# Patient Record
Sex: FEMALE | Race: BLACK OR AFRICAN AMERICAN | NOT HISPANIC OR LATINO | Employment: UNEMPLOYED | URBAN - METROPOLITAN AREA
[De-identification: names, ages, dates, MRNs, and addresses within clinical notes are randomized per-mention and may not be internally consistent; named-entity substitution may affect disease eponyms.]

---

## 2023-10-27 ENCOUNTER — TRANSFERRED RECORDS (OUTPATIENT)
Dept: HEALTH INFORMATION MANAGEMENT | Facility: CLINIC | Age: 23
End: 2023-10-27

## 2023-12-22 ENCOUNTER — TRANSCRIBE ORDERS (OUTPATIENT)
Dept: MATERNAL FETAL MEDICINE | Facility: CLINIC | Age: 23
End: 2023-12-22

## 2023-12-22 DIAGNOSIS — O26.90 PREGNANCY RELATED CONDITION, ANTEPARTUM: Primary | ICD-10-CM

## 2023-12-22 DIAGNOSIS — O09.93 SUPERVISION OF HIGH RISK PREGNANCY IN THIRD TRIMESTER: Primary | ICD-10-CM

## 2023-12-22 DIAGNOSIS — O09.33 LATE PRENATAL CARE AFFECTING PREGNANCY IN THIRD TRIMESTER: ICD-10-CM

## 2023-12-22 PROCEDURE — 99207 PR NO CHARGE NURSE ONLY: CPT | Performed by: ADVANCED PRACTICE MIDWIFE

## 2023-12-22 NOTE — PROGRESS NOTES
Pt provider at [facility] called yesterday 12/21 to get pt set up with ASAP provider care  Pt has not had any prenatal care this pregnancy, almost 33 weeks  Scheduling was unable to schedule immediate RN intake, scheduled immediately for ultrasound/provider visit    Due to delay in RN intake-writer initiated pregnancy episode and ordered prenatal labs, PAP, ultrasound, 1 hour gtt as well if provider desires at prenatal visit next week.    Will route to provider to see if want to do a fetal survey as well at [facility]  Orders for MFM pended      Routing to provider to advise.  Noemy Stearns RN on 12/22/2023 at 8:17 AM    Consulted with Camelia Brooks NP who is able to meet with patient for ultrasound review today 12/22 and possibly do new OB labs/initial prenatal discussion if patient able to stay after ultrasound. Attempted to call pt to discuss staying for appointment after ultrasound, no answer, LMTCB and message left with ultrasound tech and reception to inform pt of option with Camelia following ultrasound.  Noemy Stearns RN on 12/22/2023 at 8:33 AM

## 2023-12-28 ENCOUNTER — TELEPHONE (OUTPATIENT)
Dept: MIDWIFE SERVICES | Facility: CLINIC | Age: 23
End: 2023-12-28

## 2023-12-28 NOTE — TELEPHONE ENCOUNTER
Patient has orders for fetal survey with [facility] and comprehensive ultrasound with [facility]. Fetal survey at [facility] scheduled for 01/03.     Does patient need both scheduled or just MFM order? Patient coming in to RD today, 12/28, for first prenatal with Teri

## 2023-12-29 NOTE — TELEPHONE ENCOUNTER
Patient called clinic and is wondering why she needs [facility] US and 7th floor US. Explained to patient that she needs an US at [facility] per CNM's notes:    She does not need an US in our clinic, but definitely needs [facility]. We cannot date her pregnancy this late with our regular US. But, it looks like [facility] hasn't scheduled her yet.     Hope she shows up today!   Nelson Ramos CNM     Actually, lets keep the US in our clinic. At least we will have something to date her pregnancy and check placental location.   Thanks   Nelson Ramos CNM       Connected patient to [facility] scheduling.     WILFREDO Shaw

## 2024-01-03 ENCOUNTER — ANCILLARY PROCEDURE (OUTPATIENT)
Dept: ULTRASOUND IMAGING | Facility: CLINIC | Age: 24
End: 2024-01-03
Attending: ADVANCED PRACTICE MIDWIFE
Payer: MEDICAID

## 2024-01-03 ENCOUNTER — PRE VISIT (OUTPATIENT)
Dept: MATERNAL FETAL MEDICINE | Facility: CLINIC | Age: 24
End: 2024-01-03

## 2024-01-03 DIAGNOSIS — O09.93 SUPERVISION OF HIGH RISK PREGNANCY IN THIRD TRIMESTER: ICD-10-CM

## 2024-01-03 LAB
ABO/RH(D): NORMAL
ANTIBODY SCREEN: NEGATIVE
SPECIMEN EXPIRATION DATE: NORMAL

## 2024-01-03 PROCEDURE — 76819 FETAL BIOPHYS PROFIL W/O NST: CPT | Performed by: OBSTETRICS & GYNECOLOGY

## 2024-01-03 PROCEDURE — 76816 OB US FOLLOW-UP PER FETUS: CPT | Performed by: OBSTETRICS & GYNECOLOGY

## 2024-01-04 ENCOUNTER — TELEPHONE (OUTPATIENT)
Dept: OBGYN | Facility: CLINIC | Age: 24
End: 2024-01-04

## 2024-01-04 ENCOUNTER — PRENATAL OFFICE VISIT (OUTPATIENT)
Dept: OBGYN | Facility: CLINIC | Age: 24
End: 2024-01-04
Payer: MEDICAID

## 2024-01-04 ENCOUNTER — OFFICE VISIT (OUTPATIENT)
Dept: MATERNAL FETAL MEDICINE | Facility: CLINIC | Age: 24
End: 2024-01-04
Attending: ADVANCED PRACTICE MIDWIFE
Payer: MEDICAID

## 2024-01-04 ENCOUNTER — HOSPITAL ENCOUNTER (OUTPATIENT)
Dept: ULTRASOUND IMAGING | Facility: CLINIC | Age: 24
Discharge: HOME | End: 2024-01-04
Attending: ADVANCED PRACTICE MIDWIFE
Payer: MEDICAID

## 2024-01-04 VITALS
BODY MASS INDEX: 21.9 KG/M2 | HEIGHT: 62 IN | HEART RATE: 91 BPM | OXYGEN SATURATION: 99 % | WEIGHT: 119 LBS | TEMPERATURE: 97.4 F | DIASTOLIC BLOOD PRESSURE: 65 MMHG | SYSTOLIC BLOOD PRESSURE: 110 MMHG

## 2024-01-04 VITALS — DIASTOLIC BLOOD PRESSURE: 74 MMHG | SYSTOLIC BLOOD PRESSURE: 111 MMHG

## 2024-01-04 DIAGNOSIS — O36.5990 FETAL GROWTH RESTRICTION ANTEPARTUM: Primary | ICD-10-CM

## 2024-01-04 DIAGNOSIS — O99.019 ANEMIA IN PREGNANCY: Primary | ICD-10-CM

## 2024-01-04 DIAGNOSIS — O26.90 PREGNANCY RELATED CONDITION, ANTEPARTUM: ICD-10-CM

## 2024-01-04 DIAGNOSIS — O35.9XX0 SUSPECTED FETAL ANOMALY, ANTEPARTUM, SINGLE OR UNSPECIFIED FETUS: ICD-10-CM

## 2024-01-04 DIAGNOSIS — O09.93 HIGH-RISK PREGNANCY IN THIRD TRIMESTER: ICD-10-CM

## 2024-01-04 DIAGNOSIS — O09.93 SUPERVISION OF HIGH RISK PREGNANCY IN THIRD TRIMESTER: Primary | ICD-10-CM

## 2024-01-04 DIAGNOSIS — O99.013 ANEMIA DURING PREGNANCY IN THIRD TRIMESTER: ICD-10-CM

## 2024-01-04 LAB
ALBUMIN UR-MCNC: ABNORMAL MG/DL
APPEARANCE UR: CLEAR
BACTERIA #/AREA URNS HPF: ABNORMAL /HPF
BILIRUB UR QL STRIP: NEGATIVE
COLOR UR AUTO: YELLOW
ERYTHROCYTE [DISTWIDTH] IN BLOOD BY AUTOMATED COUNT: 13 % (ref 10–15)
FERRITIN SERPL-MCNC: 5 NG/ML (ref 6–175)
GLUCOSE 1H P 50 G GLC PO SERPL-MCNC: 98 MG/DL (ref 70–129)
GLUCOSE UR STRIP-MCNC: NEGATIVE MG/DL
HBV SURFACE AG SERPL QL IA: NONREACTIVE
HCT VFR BLD AUTO: 28.3 % (ref 35–47)
HCV AB SERPL QL IA: NONREACTIVE
HGB BLD-MCNC: 9.1 G/DL (ref 11.7–15.7)
HGB BLD-MCNC: 9.1 G/DL (ref 11.7–15.7)
HGB UR QL STRIP: ABNORMAL
HIV 1+2 AB+HIV1 P24 AG SERPL QL IA: NONREACTIVE
IRON BINDING CAPACITY (ROCHE): 487 UG/DL (ref 240–430)
IRON SATN MFR SERPL: 4 % (ref 15–46)
IRON SERPL-MCNC: 21 UG/DL (ref 37–145)
KETONES UR STRIP-MCNC: NEGATIVE MG/DL
LEUKOCYTE ESTERASE UR QL STRIP: ABNORMAL
MCH RBC QN AUTO: 27.1 PG (ref 26.5–33)
MCHC RBC AUTO-ENTMCNC: 32.2 G/DL (ref 31.5–36.5)
MCV RBC AUTO: 84 FL (ref 78–100)
MUCOUS THREADS #/AREA URNS LPF: PRESENT /LPF
NITRATE UR QL: NEGATIVE
PH UR STRIP: 6.5 [PH] (ref 5–7)
PLATELET # BLD AUTO: 300 10E3/UL (ref 150–450)
RBC # BLD AUTO: 3.36 10E6/UL (ref 3.8–5.2)
RBC #/AREA URNS AUTO: ABNORMAL /HPF
SP GR UR STRIP: >=1.03 (ref 1–1.03)
SQUAMOUS #/AREA URNS AUTO: ABNORMAL /LPF
T PALLIDUM AB SER QL: NONREACTIVE
UROBILINOGEN UR STRIP-ACNC: 0.2 E.U./DL
WBC # BLD AUTO: 8.6 10E3/UL (ref 4–11)
WBC #/AREA URNS AUTO: ABNORMAL /HPF
YEAST #/AREA URNS HPF: ABNORMAL /HPF
YEAST #/AREA URNS HPF: ABNORMAL /HPF

## 2024-01-04 PROCEDURE — 83540 ASSAY OF IRON: CPT | Performed by: OBSTETRICS & GYNECOLOGY

## 2024-01-04 PROCEDURE — 87086 URINE CULTURE/COLONY COUNT: CPT | Performed by: OBSTETRICS & GYNECOLOGY

## 2024-01-04 PROCEDURE — 86762 RUBELLA ANTIBODY: CPT | Performed by: OBSTETRICS & GYNECOLOGY

## 2024-01-04 PROCEDURE — 82950 GLUCOSE TEST: CPT | Performed by: OBSTETRICS & GYNECOLOGY

## 2024-01-04 PROCEDURE — 76811 OB US DETAILED SNGL FETUS: CPT | Mod: 26 | Performed by: OBSTETRICS & GYNECOLOGY

## 2024-01-04 PROCEDURE — 83550 IRON BINDING TEST: CPT | Performed by: OBSTETRICS & GYNECOLOGY

## 2024-01-04 PROCEDURE — 87340 HEPATITIS B SURFACE AG IA: CPT | Performed by: OBSTETRICS & GYNECOLOGY

## 2024-01-04 PROCEDURE — 86900 BLOOD TYPING SEROLOGIC ABO: CPT | Performed by: OBSTETRICS & GYNECOLOGY

## 2024-01-04 PROCEDURE — 87389 HIV-1 AG W/HIV-1&-2 AB AG IA: CPT | Performed by: OBSTETRICS & GYNECOLOGY

## 2024-01-04 PROCEDURE — 59025 FETAL NON-STRESS TEST: CPT | Mod: 26 | Performed by: OBSTETRICS & GYNECOLOGY

## 2024-01-04 PROCEDURE — 86803 HEPATITIS C AB TEST: CPT | Performed by: OBSTETRICS & GYNECOLOGY

## 2024-01-04 PROCEDURE — 86777 TOXOPLASMA ANTIBODY: CPT

## 2024-01-04 PROCEDURE — 85027 COMPLETE CBC AUTOMATED: CPT | Performed by: OBSTETRICS & GYNECOLOGY

## 2024-01-04 PROCEDURE — 36415 COLL VENOUS BLD VENIPUNCTURE: CPT | Performed by: OBSTETRICS & GYNECOLOGY

## 2024-01-04 PROCEDURE — 99204 OFFICE O/P NEW MOD 45 MIN: CPT | Mod: 25 | Performed by: OBSTETRICS & GYNECOLOGY

## 2024-01-04 PROCEDURE — 76820 UMBILICAL ARTERY ECHO: CPT | Mod: 26 | Performed by: OBSTETRICS & GYNECOLOGY

## 2024-01-04 PROCEDURE — 99203 OFFICE O/P NEW LOW 30 MIN: CPT | Performed by: OBSTETRICS & GYNECOLOGY

## 2024-01-04 PROCEDURE — 81001 URINALYSIS AUTO W/SCOPE: CPT | Performed by: OBSTETRICS & GYNECOLOGY

## 2024-01-04 PROCEDURE — 86780 TREPONEMA PALLIDUM: CPT | Performed by: OBSTETRICS & GYNECOLOGY

## 2024-01-04 PROCEDURE — 86644 CMV ANTIBODY: CPT | Performed by: OBSTETRICS & GYNECOLOGY

## 2024-01-04 PROCEDURE — 86645 CMV ANTIBODY IGM: CPT | Performed by: OBSTETRICS & GYNECOLOGY

## 2024-01-04 PROCEDURE — 86850 RBC ANTIBODY SCREEN: CPT | Performed by: OBSTETRICS & GYNECOLOGY

## 2024-01-04 PROCEDURE — 59025 FETAL NON-STRESS TEST: CPT

## 2024-01-04 PROCEDURE — 86901 BLOOD TYPING SEROLOGIC RH(D): CPT | Performed by: OBSTETRICS & GYNECOLOGY

## 2024-01-04 PROCEDURE — 76820 UMBILICAL ARTERY ECHO: CPT

## 2024-01-04 PROCEDURE — 82728 ASSAY OF FERRITIN: CPT | Performed by: OBSTETRICS & GYNECOLOGY

## 2024-01-04 ASSESSMENT — PATIENT HEALTH QUESTIONNAIRE - PHQ9: SUM OF ALL RESPONSES TO PHQ QUESTIONS 1-9: 11

## 2024-01-04 NOTE — NURSING NOTE
Patient reports positive fetal movement, no pain, no contractions, leaking of fluid, or bleeding.   Patient denies headache, visual changes, nausea/vomiting, epigastric pain related to preeclampsia.  Education provided to patient on NST/dopplers/BP.  SBAR given to BEVERLY GEE, see their note in Epic.Patient states she has had prenatal care at [facility] in [facility]. Have spoke to prenatal care coordinator and VERO has been signed by patient and attempting to get prenatal records. Patient aware that she will be seen weekly and will receive a phone call from us to schedule her fetal MRI   Le Yanes RN

## 2024-01-04 NOTE — TELEPHONE ENCOUNTER
Per [facility]--pt very overwhelmed after imaging at [facility]  Reports SW was discussed at visit and would like to very much speak with SW.  [facility] also has records of some prenatal care in [place], will send up to our clinic.  Pended order-verify if correct?  Routing to provider to advise.  Noemy Stearns, RN on 1/4/2024 at 4:39 PM

## 2024-01-04 NOTE — PROGRESS NOTES
"CC: New Ob visit  HPI: Ofe Samuels is a 23 year old  here for new Ob visit.  She is a late LASHON as she moved to [place] from [place] about 8 weeks ago.  She has missed a few visits until today, reports that she has been feeling well and has good movement.      She has not received prenatal care in [place] other than ER visit through [facility] on .  Us done showing normal FHR and placenta not low lying.  No anatomy or sizing performed/    She states that she did receive regular care in [place], although we have no records.  She cannot remember how she was dated, states she had an early ultrasound but does not remember when.  She shows me a txt chain on her phone, beginning  about upcoming prenatal appts through oct when she moved.  There are 3 different clinics and every few days there is a new appt confirmation, so I suspect she was not regularly (if at all) attending these visit.      She has housing instability, reports she is \"working on\" getting a permanent place.  She also has transportation issues.  She is relying on a friend today who has a small child with her.  She does not have insurance, has not tried to obtain [place] care since moving here.    She had an ultrasound yesterday for growth and placental location in our office with plan for full anatomy to be done with [facility] on .  During her ultrasound, the growth was found to be <1%tile for all measurements and bilateral ventriculomegaly with abnormal appearance of cerebral anatomy.  We were able to get her schedule with [facility] today for repeat ultrasound.    We discussed these findings and I attempted to get a more accurate picture about her dating to determine if her current dates are good or not.  I explained right now we can't confirm whether she has severe IUGR or not, but since first prenatal visits were to be done in , she is likely at least close to her current dating.  I explained that I have concerns about the cerebral findings, but we will have " "more information from ultrasound with MFM today and f/u plans will be made based on that.  She was understandably distressed about the news and worried about what this means for the baby.  I offered her to stay here until her us appt if she does not have reliable transportation, but she assures me she will be able to return for that visit.    Past Medical History:   Diagnosis Date    Known health problems: none        Past Surgical History:   Procedure Laterality Date    NO HISTORY OF SURGERY       OB History    Para Term  AB Living   1 0 0 0 0 0   SAB IAB Ectopic Multiple Live Births   0 0 0 0 0      # Outcome Date GA Lbr Jacek/2nd Weight Sex Delivery Anes PTL Lv   1 Current              No current outpatient medications on file.    No Known Allergies    No family history on file.    Past medical, social, surgical and family history were reviewed and updated in EPIC.    ROS: No TIA's or unusual headaches, no dysphagia.  No prolonged cough. No dyspnea or chest pain on exertion.  No abdominal pain, change in bowel habits, black or bloody stools.  No urinary tract symptoms.  No new or unusual musculoskeletal symptoms.  Normal menses, no abnormal vaginal bleeding, discharge or unexpected pelvic pain. No new breast lumps, breast pain or nipple discharge.    PE: /65   Pulse 91   Temp 97.4  F (36.3  C)   Ht 1.575 m (5' 2\")   Wt 54 kg (119 lb)   LMP 2023   SpO2 99%   BMI 21.77 kg/m      Gen: Healthy appearing female in no acute distress  Heart: RRR  Lungs: CTAB  Abd: +BS, SNT  Ex: No C/C/E, no suspicious rashes or lesions    A/P:  1) IUP at 34w4d, limited prenatal care.  Fetal IUGR and ventriculomegaly        PNL today with abraham        Unable to find any records in care everywhere under the clinics she was scheduled to be seen in [place].  VERO signed for all 3.  We had an extensive discussion about the ultrasound findings from yesterday and need for more information before understanding " the implications for the remainder of her pregnancy and for the baby after delivery.  She has [facility] us scheduled today at 1:30 and assures me she will be able to get a ride back, declines offer to stay here and work with care coordinator (if available) to begin process of getting insurance.          Reviewed anticipated course of remaining prenatal care, including likely need for regular ultrasound and early delivery based on ongoing findings and decisions regarding IUGR status        Discussed MD call schedule as well as role of residents and med students both in clinic and hospital.  She is ok with resident care       RTC weekly    Belinda Gonzalez MD

## 2024-01-04 NOTE — TELEPHONE ENCOUNTER
Thanks.  I already placed a care coordination referral, so I canceled this one.  Do you know if that is what they were referring to or would she like to talk with Michaelle as well?  If so, we can just forward her chart to michaelle and she will reach out to the patient to talk.  That would be more therapy and not helping her get services (care coord).  Thanks    ASHISH FARR MD

## 2024-01-05 ENCOUNTER — PATIENT OUTREACH (OUTPATIENT)
Dept: CARE COORDINATION | Facility: CLINIC | Age: 24
End: 2024-01-05
Payer: MEDICAID

## 2024-01-05 DIAGNOSIS — D50.9 IRON DEFICIENCY ANEMIA OF MOTHER DURING PREGNANCY: Primary | ICD-10-CM

## 2024-01-05 DIAGNOSIS — O99.019 IRON DEFICIENCY ANEMIA OF MOTHER DURING PREGNANCY: Primary | ICD-10-CM

## 2024-01-05 DIAGNOSIS — O35.9XX0 SUSPECTED FETAL ANOMALY, ANTEPARTUM, SINGLE OR UNSPECIFIED FETUS: ICD-10-CM

## 2024-01-05 PROBLEM — O36.5990 PREGNANCY AFFECTED BY FETAL GROWTH RESTRICTION: Status: ACTIVE | Noted: 2024-01-05

## 2024-01-05 PROBLEM — O35.09X0 PREGNANCY COMPLICATED BY FETAL CEREBRAL VENTRICULOMEGALY: Status: ACTIVE | Noted: 2024-01-05

## 2024-01-05 LAB
BACTERIA UR CULT: NORMAL
CMV IGG SERPL IA-ACNC: >10 U/ML
CMV IGG SERPL IA-ACNC: ABNORMAL
CMV IGM SERPL IA-ACNC: 17.8 AU/ML
CMV IGM SERPL IA-ACNC: NEGATIVE
RUBV IGG SERPL QL IA: 11.2 INDEX
RUBV IGG SERPL QL IA: POSITIVE

## 2024-01-05 RX ORDER — DIPHENHYDRAMINE HYDROCHLORIDE 50 MG/ML
50 INJECTION INTRAMUSCULAR; INTRAVENOUS
Status: CANCELLED
Start: 2024-01-05

## 2024-01-05 RX ORDER — METHYLPREDNISOLONE SODIUM SUCCINATE 125 MG/2ML
125 INJECTION, POWDER, LYOPHILIZED, FOR SOLUTION INTRAMUSCULAR; INTRAVENOUS
Status: CANCELLED
Start: 2024-01-05

## 2024-01-05 RX ORDER — HEPARIN SODIUM,PORCINE 10 UNIT/ML
5-20 VIAL (ML) INTRAVENOUS DAILY PRN
Status: CANCELLED | OUTPATIENT
Start: 2024-01-05

## 2024-01-05 RX ORDER — ALBUTEROL SULFATE 90 UG/1
1-2 AEROSOL, METERED RESPIRATORY (INHALATION)
Status: CANCELLED
Start: 2024-01-05

## 2024-01-05 RX ORDER — EPINEPHRINE 1 MG/ML
0.3 INJECTION, SOLUTION, CONCENTRATE INTRAVENOUS EVERY 5 MIN PRN
Status: CANCELLED | OUTPATIENT
Start: 2024-01-05

## 2024-01-05 RX ORDER — ALBUTEROL SULFATE 0.83 MG/ML
2.5 SOLUTION RESPIRATORY (INHALATION)
Status: CANCELLED | OUTPATIENT
Start: 2024-01-05

## 2024-01-05 RX ORDER — HEPARIN SODIUM (PORCINE) LOCK FLUSH IV SOLN 100 UNIT/ML 100 UNIT/ML
5 SOLUTION INTRAVENOUS
Status: CANCELLED | OUTPATIENT
Start: 2024-01-05

## 2024-01-05 RX ORDER — MEPERIDINE HYDROCHLORIDE 25 MG/ML
25 INJECTION INTRAMUSCULAR; INTRAVENOUS; SUBCUTANEOUS EVERY 30 MIN PRN
Status: CANCELLED | OUTPATIENT
Start: 2024-01-05

## 2024-01-05 NOTE — PROGRESS NOTES
Clinic Care Coordination Contact  [facility]/Voicemail    Chart Review: Specialty reverral from Dr. Gonzalez at [facility] on 1/4/24:  Financial Support - food, transportation, housing, insurance  Additional Information - new pregnant transfer of care from [place] at 34weeks gestation.  unstable housing and no insurance, has not started process of applying.  new pregnancy complications discoverd, will need help getting any services available.  thanks     Clinical Data: Care Coordinator Outreach    Outreach Documentation Number of Outreach Attempt   1/5/2024  10:23 AM 1       Left message on patient's voicemail with call back information and requested return call.    Plan: Care Coordinator will try to reach patient again in 1-2 business days.    Theresa Morrison  Community Health Worker  [facility]  [phone]\

## 2024-01-05 NOTE — PROGRESS NOTES
Clinic Care Coordination Contact  Community Health Worker Initial Outreach      Chart Review: Specialty reverral from Dr. Gonzalez at [facility] on 1/4/24:  Financial Support - food, transportation, housing, insurance  Additional Information - new pregnant transfer of care from [place] at 34weeks gestation.  unstable housing and no insurance, has not started process of applying.  new pregnancy complications discoverd, will need help getting any services available.  thanks       CHW Initial Information Gathering:  Referral Source: Specialist ([facility])  Current living arrangement:: Other (Pt is homeless. Living with a family and their children.)  Type of residence:: Nursing home  Informal Support system:: None  No PCP office visit in Past Year: Yes  Transportation means:: Other (The family she is staying with is providing transportation to medical appointments.)  CHW Additional Questions  If ED/Hospital discharge, follow-up appointment scheduled as recommended?: N/A  Medication changes made following ED/Hospital discharge?: N/A  MyChart active?: Yes  Patient sent Social Determinants of Health questionnaire?: Yes    Patient accepts CC: Yes. Patient scheduled for assessment with JEANNETTE Sanders, on 1/11/24 at 11:00 am. Patient noted desire to discuss WIC, county benefits, insurance, baby supplies, transportation, mental health resources, food resources, housing.       Financial Resource Worker Screening    [facility] Benefits  Is patient requesting help applying for [facility] benefits?: Yes  Have you recently applied for any [facility] benefits?: Yes (12/8/23 around that time)  What was applied for? : SNAP, CASH  Application date:: Beginning of Dec 2023  How many people in your household?: 5  Do you buy/eat food together?: No  What is the monthly gross income for the household (wages, social security, workers comp, and pension)? : 0    Insurance:  [place] insurance?: No  Is this an insurance renewal?: No  Is  "this a new insurance application request?: Yes  Have you recently applied for insurance?: No  How many people in your household? : 5  Do you file taxes?: Yes  How many dependents do you claim?: 1  What is the monthly gross income for the household (wages, social security, workers comp, and pension)? : 0    Any other information for the FRW?: Pt is homeless and recently (last 3 months) came from [place]. Living with a family and their children.  Appears that she applied for [facility] benefits [facility] 12/8/23; had not heard back from the [facility] yet      Pt called me back this morning:  Housing - staying with a family right now. When pt asks if they are friends of her she responds \"sorta\". Recently homeless. Came from [place] within the last 3 months and is \"trying to get on my feet\".  Food - trying to share food with her  Employment - not working  Insurance - does not have any  Baby supplies - does not have any  Transportation - does not drive. People she lives with are helping her get to appointments  Pregnancy - high risk pregnancy. May deliver the baby early, per pt  Mental Health support - pt requests this. \"It's been brooks tough, you know?\"  PCP - pt has none. Explained that CC can assist her til her 6 weeks post partum visit, then would need to establish with a PCP who would continue to assist her with her CC needs. Explained that there are providers within the  [facility] who have availability to see new pts if she would like to establish there.     Theresa Morrison  Community Health Worker  [facility]  [phone]      "

## 2024-01-05 NOTE — PROGRESS NOTES
Clinic Care Coordination Contact  Program: Health insurance,   County:  [facility] Case #:  County Worker:   Stephen #:   Subscriber #:   Renewal:  Date Applied:     FRW Outreach:   1/5/24 -Patient not available during time of call. FRW will call again within 30 days.   Lisseth Call  Financial Resource Worker  [facility]  Clinic Care Coordination  [phone]    Health Insurance:      Referral/Screening:  FRW Screening      Row Name 01/05/24 1111       [facility] Benefits   Is patient requesting help applying for [facility] benefits? Yes       Have you recently applied for any [facility] benefits? Yes  12/8/23 around that time       What was applied for? SNAP;CASH       Application date: Beginning of Dec 2023       How many people in your household? 5       Do you buy/eat food together? No       What is the monthly gross income for the household (wages, social security, workers comp, and pension)? 0       Insurance:   [place]? No       Is this an insurance renewal? No       Is this a new insurance application request? Yes       Have you recently applied for insurance? No       How many people in your household? 5       Do you file taxes? Yes       How many dependents do you claim? 1       What is the monthly gross income for the household (wages, social security, workers comp, and pension)? 0       OTHER   Is this a quita care application? No       Any other information for the FRW? Pt is homeless and recently (last 3 months) came from [place]. Living with a family and their children.  Appears that she applied for [facility] benefits [employer] 12/8/23; had not heard back from the [facility] yet

## 2024-01-06 LAB
T GONDII IGG SER-ACNC: 113 IU/ML
T GONDII IGM SER-ACNC: 6.8 AU/ML

## 2024-01-08 ENCOUNTER — PATIENT OUTREACH (OUTPATIENT)
Dept: CARE COORDINATION | Facility: CLINIC | Age: 24
End: 2024-01-08
Payer: MEDICAID

## 2024-01-08 NOTE — PROGRESS NOTES
Clinic Care Coordination Contact  Program: Health insurance, SNAP,  County: [facility] Case #:  [facility] Worker:   Stephen #:   Subscriber #:   Renewal:  Date Applied:     FRW Outreach:   1/8/24 - Completed application for health insurance already. Approved for [place]. Patient applied for SNAP/CASH already, waiting for [facility] to process application. Case was previously in [place] but transferred to [place]. Patient will call [facility] to check application and see if they would be willing to expedite.   Lisseth Call  Financial Resource Worker  [facility] Care Coordination  [phone]    1/5/24 -Patient not available during time of call. FRW will call again within 30 days.   Lisseth Call  Financial Resource Worker  [facility] Care Coordination  [phone]    Health Insurance:  This subscriber has eligibility for [place]: Medical Assistance.  Elig Type PX: Pregnant woman  Eligibility Begin Date: 10/01/2023  Eligibility End Date: --/--/----  This subscriber is eligible for the following service types: Medical Care ,  Chiropractic ,  Dental Care ,  Hospital ,  Hospital - Inpatient ,  Hospital - Outpatient ,  Emergency Services ,  Pharmacy ,  Professional (Physician) Visit - Office ,  Vision (Optometry) ,  Mental Health ,  Urgent Care    Referral/Screening:  [facility] Screening      Row Name 01/05/24 1111       [facility] Benefits   Is patient requesting help applying for [facility] benefits? Yes       Have you recently applied for any [facility] benefits? Yes  12/8/23 around that time       What was applied for? SNAP;CASH       Application date: Beginning of Dec 2023       How many people in your household? 5       Do you buy/eat food together? No       What is the monthly gross income for the household (wages, social security, workers comp, and pension)? 0       Insurance:   [place]? No       Is this an insurance renewal? No       Is this a new insurance application  request? Yes       Have you recently applied for insurance? No       How many people in your household? 5       Do you file taxes? Yes       How many dependents do you claim? 1       What is the monthly gross income for the household (wages, social security, workers comp, and pension)? 0       OTHER   Is this a quita care application? No       Any other information for the FRW? Pt is homeless and recently (last 3 months) came from [place]. Living with a family and their children.  Appears that she applied for [facility] benefits teresa 12/8/23; had not heard back from the [facility] yet

## 2024-01-09 ENCOUNTER — DOCUMENTATION ONLY (OUTPATIENT)
Dept: MATERNAL FETAL MEDICINE | Facility: CLINIC | Age: 24
End: 2024-01-09
Payer: MEDICAID

## 2024-01-10 ENCOUNTER — TELEPHONE (OUTPATIENT)
Dept: MATERNAL FETAL MEDICINE | Facility: CLINIC | Age: 24
End: 2024-01-10
Payer: MEDICAID

## 2024-01-10 NOTE — TELEPHONE ENCOUNTER
Pt called to offer Fetal MRI appointment on 1/16/24 at 10:30 am rather than 1/22/24. Pt agreeable to change. Verbally given MRI instructions, and pt emailed personalized MRI Fetal Patient Handout with date/time, instructions, directions to [facility], and parking information. Pt given PCC contact information for further questions. Pt was also wondering about dating change d/t FGR diagnosis at last US. Pt informed that dating at this time remains the same as her previous ultrasounds matched LMP dating. Pt verbalized understanding. Denies further questions or concerns at this time.    Gabriella Sepulveda RN

## 2024-01-11 ENCOUNTER — PATIENT OUTREACH (OUTPATIENT)
Dept: CARE COORDINATION | Facility: CLINIC | Age: 24
End: 2024-01-11

## 2024-01-11 NOTE — PROGRESS NOTES
Clinic Care Coordination Contact  [facility]/Voicemail    Clinical Data: Care Coordinator Outreach    Outreach Documentation Number of Outreach Attempt   1/5/2024  10:23 AM 1   1/11/2024  11:32 AM 1       Left message on patient's voicemail with call back information and requested return call.    Plan: Care Coordinator will send unable to contact letter with care coordinator contact information via [employer]. Care Coordinator will try to reach patient again in 1-2 business days.    Marilyn Salmon [facility]  Phone: [phone]  E-mail: [email or web address]

## 2024-01-12 ENCOUNTER — OFFICE VISIT (OUTPATIENT)
Dept: MATERNAL FETAL MEDICINE | Facility: CLINIC | Age: 24
End: 2024-01-12
Attending: OBSTETRICS & GYNECOLOGY
Payer: MEDICAID

## 2024-01-12 ENCOUNTER — PRENATAL OFFICE VISIT (OUTPATIENT)
Dept: OBGYN | Facility: CLINIC | Age: 24
End: 2024-01-12
Payer: MEDICAID

## 2024-01-12 ENCOUNTER — HOSPITAL ENCOUNTER (OUTPATIENT)
Dept: ULTRASOUND IMAGING | Facility: CLINIC | Age: 24
Discharge: HOME | End: 2024-01-12
Attending: OBSTETRICS & GYNECOLOGY
Payer: MEDICAID

## 2024-01-12 VITALS — SYSTOLIC BLOOD PRESSURE: 117 MMHG | HEART RATE: 97 BPM | OXYGEN SATURATION: 100 % | DIASTOLIC BLOOD PRESSURE: 73 MMHG

## 2024-01-12 VITALS
HEART RATE: 90 BPM | SYSTOLIC BLOOD PRESSURE: 112 MMHG | DIASTOLIC BLOOD PRESSURE: 70 MMHG | OXYGEN SATURATION: 100 % | TEMPERATURE: 98.9 F | BODY MASS INDEX: 21.75 KG/M2 | WEIGHT: 118.9 LBS

## 2024-01-12 DIAGNOSIS — O35.06X0 FETAL HYDROCEPHALUS AFFECTING MANAGEMENT OF MOTHER IN SINGLETON PREGNANCY: ICD-10-CM

## 2024-01-12 DIAGNOSIS — O35.09X0 PREGNANCY COMPLICATED BY FETAL CEREBRAL VENTRICULOMEGALY, SINGLE OR UNSPECIFIED FETUS: Primary | ICD-10-CM

## 2024-01-12 DIAGNOSIS — O36.5990 FETAL GROWTH RESTRICTION ANTEPARTUM: ICD-10-CM

## 2024-01-12 DIAGNOSIS — O09.93 SUPERVISION OF HIGH RISK PREGNANCY IN THIRD TRIMESTER: Primary | ICD-10-CM

## 2024-01-12 DIAGNOSIS — O36.5990 FETAL GROWTH RESTRICTION ANTEPARTUM: Primary | ICD-10-CM

## 2024-01-12 DIAGNOSIS — O35.9XX0 SUSPECTED FETAL ANOMALY, ANTEPARTUM, SINGLE OR UNSPECIFIED FETUS: ICD-10-CM

## 2024-01-12 PROCEDURE — 87653 STREP B DNA AMP PROBE: CPT | Performed by: OBSTETRICS & GYNECOLOGY

## 2024-01-12 PROCEDURE — 86778 TOXOPLASMA ANTIBODY IGM: CPT | Mod: 90 | Performed by: OBSTETRICS & GYNECOLOGY

## 2024-01-12 PROCEDURE — 36415 COLL VENOUS BLD VENIPUNCTURE: CPT | Performed by: OBSTETRICS & GYNECOLOGY

## 2024-01-12 PROCEDURE — 99212 OFFICE O/P EST SF 10 MIN: CPT | Performed by: OBSTETRICS & GYNECOLOGY

## 2024-01-12 PROCEDURE — 76815 OB US LIMITED FETUS(S): CPT | Mod: 26 | Performed by: OBSTETRICS & GYNECOLOGY

## 2024-01-12 PROCEDURE — 96040 HC GENETIC COUNSELING, EACH 30 MINUTES: CPT

## 2024-01-12 PROCEDURE — 59025 FETAL NON-STRESS TEST: CPT | Mod: 26 | Performed by: OBSTETRICS & GYNECOLOGY

## 2024-01-12 PROCEDURE — 59025 FETAL NON-STRESS TEST: CPT

## 2024-01-12 PROCEDURE — 76820 UMBILICAL ARTERY ECHO: CPT | Mod: 26 | Performed by: OBSTETRICS & GYNECOLOGY

## 2024-01-12 PROCEDURE — 86777 TOXOPLASMA ANTIBODY: CPT | Mod: 90 | Performed by: OBSTETRICS & GYNECOLOGY

## 2024-01-12 PROCEDURE — 86406 PARTICLE AGGLUT ANTBDY TITR: CPT | Mod: 90 | Performed by: OBSTETRICS & GYNECOLOGY

## 2024-01-12 PROCEDURE — 99000 SPECIMEN HANDLING OFFICE-LAB: CPT | Performed by: OBSTETRICS & GYNECOLOGY

## 2024-01-12 PROCEDURE — 76820 UMBILICAL ARTERY ECHO: CPT

## 2024-01-12 PROCEDURE — 86644 CMV ANTIBODY: CPT | Mod: 90 | Performed by: OBSTETRICS & GYNECOLOGY

## 2024-01-12 RX ORDER — FERROUS SULFATE 325(65) MG
1 TABLET ORAL
Status: ON HOLD | COMMUNITY
Start: 2023-06-26 | End: 2024-01-24

## 2024-01-12 RX ORDER — PNV NO.95/FERROUS FUM/FOLIC AC 28MG-0.8MG
1 TABLET ORAL
Status: ON HOLD | COMMUNITY
Start: 2023-06-26 | End: 2024-01-24

## 2024-01-12 RX ORDER — DIPHENHYDRAMINE HYDROCHLORIDE 25 MG/1
1 CAPSULE ORAL
Status: ON HOLD | COMMUNITY
Start: 2023-07-07 | End: 2024-01-23

## 2024-01-12 NOTE — NURSING NOTE
Patient reports positive fetal movement, denies contractions, leaking of fluid, or bleeding.  SBAR given to BEVERLY GEE, see their note in Epic.  NST Performed due to severe FGR.  Dr. Brenner reviewed efm tracing. See NST/BPP Doc Flowsheet tab.

## 2024-01-12 NOTE — PROGRESS NOTES
"[facility]  Genetic Counseling Consult    Patient: Ofe Samuels YOB: 2000   Date of Service: 24      Ofe Samuels was seen at [facility] for genetic consultation to discuss the options for  genetic testing given prenatal ultrasound finding of fetal growth restriction and ventriculomegaly.        Impression/Plan:   1.  Today we reviewed option of  testing and inpatient genetics consultation. Consent was obtained today and has been scanned into her chart. Plan for SNP array with limited G-bands on cord blood at time of delivery and inpatient genetics consult. Orders will be placed and held in fetal chart. The patient will be contacted to discuss results as they become available.    2. Ofe also had a follow-up ultrasound today. Please see ultrasound report for details.        FAMILY HISTORY   A three-generation pedigree was obtained today and is scanned under the \"Media\" tab in Epic. The family history was reported by Ofe.    The following significant findings were reported today:   Ofe reports that she has a paternal half brother who had speech delays.   Otherwise, Ofe reports no other significant findings for any of her family members, although she notes that most of her family members do not live in the [place] and she has limited information about their health/development.   The father of the pregnancy, Drew, is 29 and healthy. He has a 6 year old daughter with a previous partner who is healthy and typically developing. Otherwise, no significant family history findings were reported for Drew's family members.     Otherwise, the reported family history is unremarkable for multiple miscarriages, stillbirths, birth defects, intellectual disabilities, known genetic conditions, and consanguinity.    Discussion:   Ofe's level II ultrasound at 34w4d identified ventriculomegaly and fetal growth " restriction. Ventriculomegaly is an enlargement of the fluid-filled spaces (ventricles) in each side of the brain. The typical width of each ventricle is less than 10 mm. Mild ventriculomegaly is typically 10-12 mm, moderate ventriculomegaly 12-15 mm and severe ventriculomegaly above 15 mm. Severe ventriculomegaly is sometimes referred to as hydrocephalus. Moderate or severe ventriculomegaly is concerning, especially if there are other abnormalities detected. The ventriculomegaly identified in Ofe's pregnancy was classified as severe. Severe ventriculomegaly is frequently associated with neurodevelopmental delays. However, we reviewed that it is impossible to predict the exact neurodevelopmental prognosis for a fetus prenatally and this could be a wide range.     We discussed that ventriculomegaly can be isolated or can have an underlying cause including fetal infections or genetic conditions. Severe bilateral ventriculomegaly in the setting of fetal growth restriction increases concern for an underlying etioogy.     Approximately 5% of fetuses with isolated mild to moderate ventriculomegaly have an abnormal karyotype, with most common finding of trisomy 21. Of note, Ofe has had a low-risk NIPT test which significantly reduces the risk of trisomy 21, trisomy 18, or trisomy 13 in her pregnancy. We reviewed that there are many other genetic conditions that can be associated with ventriculomegaly, including copy number variants and single-gene conditions.     Ventriculomegaly and growth restriction can also be caused by infections, including toxoplasma gondii and cytomegalovirus. Ofe has had maternal serum CMV and toxoplasmosis IgG and IgM levels drawn at 34w4d, both of which identified a positive IgG and negative IgM. These results are consistent with a past infection (likely at least 6 months ago) but NOT a current infection. However, given Ofe's gestational age at time of draw, a first trimester infection cannot  be ruled out. Ofe denies any illnesses during pregnancy, exposure to cats, or eating raw meat. She has spent a lot of time around children in the current pregnancy. CMV IgG avidity and toxoplasmosis labs through [facility] were drawn today; please see Dr. Brenner's ultrasound report for details.     After a brief review of Ofe 's prenatal history and available testing options, Ofe was interested in pursuing genetic testing on cord blood at the time of delivery to clarify the possibility of an underlying genetic etiology for the identified ventriculomegaly and fetal growth restriction.  We reviewed the option of SNP array with limited G-bands on cord blood testing ([id number]), which can help clarify prognostic information and possible recurrence risk. We discussed that this testing cannot completely rule out the possibility of all genetic conditions such as single gene disorders and that it is possible additional testing may be recommended by pediatric genetics after delivery.      Microarray testing involves looking for small extra (duplications) or missing (deletions) pieces of DNA within the chromosomes. Chromosomal deletions and duplications may cause problems with an individual's health and development including learning disabilities, developmental delays, growth issues, physical differences, and psychiatric challenges. The specific symptoms would depend on the size and gene content of the specific chromosomal region involved.  Microarray testing can also identify whether there are areas within a pair of chromosomes that are too similar to each other, which could indicate that the individual's parents may be related to each other such as cousins, or could represent a phenomenon known as uniparental disomy (UPD) which is where an individual inherits more of a specific chromosome region from one parent than the other parent.  Depending on the chromosome(s) involved, this  genetic similarity  can sometimes lead to  growth, developmental and/or physical problems for the individual.     We reviewed the benefits, limitations, and possible results from a microarray analysis which can include:    Negative: No clinically significant deletions or duplications were identified. This may not rule out a genetic cause for the fetal features.    Positive: A deletion, duplication, or genetic similarity was identified that may be associated with a particular set of symptoms or known syndrome.     Variant of uncertain significance (VUS): A deletion or duplication was identified, but it is not known if it explains the clinical features or it is is normal variation.    Chromosome analysis (karyotype) assesses for the number and structure of chromosomes. It would be able to assess if chromosomal translocations are present or erroneously rearranged. It would be able to determine if there are small pieces of chromosomal material that are duplicated or deleted, however not to the level of detail that a microarray would be able to provide. This test would provide information regarding recurrence risks for aneuploidies (e.g. trisomy 21 due to sporadic non-disjunction vs due to translocation).     Specimen requirements for [id number] are 5 mL of cord blood in a green top tube and 5mL of cord blood in a purple top tube. The consent for genetic testing was signed at today's visit and is scanned into her chart.     I also offered Ofe a prenatal genetics consult with Dr. Magana following her MRI if we are able to coordinate this prior to delivery, which she is interested in. I will ask PCCs to help coordinate this appointment.     Ofe is understandably struggling to process all of the information we discussed today and is feeling worried about what care her baby will need immediately after delivery as well as what these findings might mean long-term for her baby's health and neurodevelopment. I normalized her feeling of anxiety about the uncertainty  surrounding her baby's prognosis. I offered a referral to [facility] for additional psychosocial support, which Ofe accepted today.     It was a pleasure to be involved with Ofe 's care. Face-to-face time of the meeting was 45 minutes.    Belinda Shetty [facility], [facility]  Licensed Genetic Counselor  [facility]  Maternal Fetal Medicine  Phone: [phone]  [email or web address]

## 2024-01-12 NOTE — PROGRESS NOTES
"Please see \"Imaging\" tab under \"Chart Review\" for details of today's US at the [facility].    Faraz Brenner MD  Maternal-Fetal Medicine    "

## 2024-01-12 NOTE — PROGRESS NOTES
35w5d feeling ok physically but worried and feels like she just doesn't understand what she should even worry about.  Doesn't understand why delivery is recommended at 37w and what will happen to the baby after birth.  I explained the two issues are FGR and the ventriculomegaly.  I explained that during pregnancy FGR is the main concern as the risk is stillbirth.  The reason for delivery at 37w is that is the tipping point when the risks to the baby (IUGR) outweighs the risk of early delivery.  We discussed that the concerns about the brain will be more prominent after delivery.  She verbalizes understanding and is agreeable to scheduling IOL on .    We did discuss that we are still waiting for some lab results and also the MRI next week for more assessment of possible cause and imaging of brain, but ultimately, we won't know overall condition of baby until after birth and maybe not even for a while beyond that.  We discussed the induction process and that it may take 24-48h. I explained that sometimes baby's with FGR do not tolerate labor well so she is at increased risk for .  We discussed what she should bring to hospital and what is available for her and baby.  We discussed NICU at delivery and continued monitoring by peds following birth, but until then we won't really know what that looks like.  She verbalizes understanding.      She has her iv iron infusions scheduled, but first not until .  I asked her to keep that appt unless conflict arises and we can give additional dose after delivery if needed.  GBS today.  RTC weekly  vi

## 2024-01-13 LAB — GP B STREP DNA SPEC QL NAA+PROBE: NEGATIVE

## 2024-01-16 ENCOUNTER — HOSPITAL ENCOUNTER (OUTPATIENT)
Dept: MRI IMAGING | Facility: CLINIC | Age: 24
Discharge: HOME | End: 2024-01-16
Attending: OBSTETRICS & GYNECOLOGY | Admitting: OBSTETRICS & GYNECOLOGY
Payer: MEDICAID

## 2024-01-16 ENCOUNTER — TELEPHONE (OUTPATIENT)
Dept: MATERNAL FETAL MEDICINE | Facility: CLINIC | Age: 24
End: 2024-01-16
Payer: MEDICAID

## 2024-01-16 DIAGNOSIS — O35.9XX0 SUSPECTED FETAL ANOMALY, ANTEPARTUM, SINGLE OR UNSPECIFIED FETUS: ICD-10-CM

## 2024-01-16 LAB — CMV IGG AVIDITY SERPL IA-RTO: 0.95 %

## 2024-01-16 PROCEDURE — 74712 MRI FETAL SNGL/1ST GESTATION: CPT

## 2024-01-16 PROCEDURE — 74712 MRI FETAL SNGL/1ST GESTATION: CPT | Mod: 26 | Performed by: RADIOLOGY

## 2024-01-16 NOTE — TELEPHONE ENCOUNTER
January 16, 2024    Received voicemail from Ofe stating that she changed her mind and wishes to cancel the genetic testing ordered on cord blood in the fetal chart and requesting a call back. I called Ofe back and she confirmed that she has thought more about genetic testing and has decided that she wishes to wait until her baby is born before deciding whether she wishes to proceed with genetic testing. She also asked that I cancel the order for an inpatient genetics consultation. She is aware that genetics involvement will likely be brought up again by her baby's NICU providers and she is open to this discussion, but wants to wait until her baby is born before making any decisions about genetics care.     I encouraged Ofe to reach out to me if she has any further questions prior to her delivery.     Plan:  Ofe DECLINED genetic testing on cord blood. The order previously placed in the fetal chart will be cancelled.   Ofe DECLINED inpatient genetics consultation at this time. She is open to discussing the option of a genetics consultation again after delivery but does not want this order placed in the fetal chart at this time. The order previously placed in the fetal chart will be cancelled.     Belinda Shetty, [facility], [facility]  Licensed Genetic Counselor  [facility]  Maternal Fetal Medicine  Phone: [phone]  [email or web address]    NPO  NGT to wall suction  KUB daily  CXR today nothing acute  IVF started NS at 150ml/hr    He meets SIRS criteria but I think better explained by SBO presentation that infection.  UA > 100 WBC but he has no urinary tract symptoms. He has recurrent UTI and will start Rocephin now but my clinical impression is the abnormal vitals and elevated lactic acid are due to SBO, dehydration and not UTI sepsis

## 2024-01-18 ENCOUNTER — TELEPHONE (OUTPATIENT)
Dept: OBGYN | Facility: CLINIC | Age: 24
End: 2024-01-18

## 2024-01-18 ENCOUNTER — HOSPITAL ENCOUNTER (OUTPATIENT)
Dept: ULTRASOUND IMAGING | Facility: CLINIC | Age: 24
Discharge: HOME | End: 2024-01-18
Attending: OBSTETRICS & GYNECOLOGY
Payer: MEDICAID

## 2024-01-18 ENCOUNTER — OFFICE VISIT (OUTPATIENT)
Dept: MATERNAL FETAL MEDICINE | Facility: CLINIC | Age: 24
End: 2024-01-18
Attending: OBSTETRICS & GYNECOLOGY
Payer: MEDICAID

## 2024-01-18 ENCOUNTER — PRENATAL OFFICE VISIT (OUTPATIENT)
Dept: OBGYN | Facility: CLINIC | Age: 24
End: 2024-01-18
Payer: MEDICAID

## 2024-01-18 VITALS
DIASTOLIC BLOOD PRESSURE: 72 MMHG | TEMPERATURE: 100.5 F | OXYGEN SATURATION: 100 % | WEIGHT: 121.8 LBS | BODY MASS INDEX: 22.28 KG/M2 | SYSTOLIC BLOOD PRESSURE: 106 MMHG | HEART RATE: 96 BPM

## 2024-01-18 DIAGNOSIS — O36.5990 FETAL GROWTH RESTRICTION ANTEPARTUM: ICD-10-CM

## 2024-01-18 DIAGNOSIS — O09.93 SUPERVISION OF HIGH RISK PREGNANCY IN THIRD TRIMESTER: Primary | ICD-10-CM

## 2024-01-18 DIAGNOSIS — O36.5990 FETAL GROWTH RESTRICTION ANTEPARTUM: Primary | ICD-10-CM

## 2024-01-18 DIAGNOSIS — N89.8 VAGINAL DISCHARGE: ICD-10-CM

## 2024-01-18 LAB
CLUE CELLS: ABNORMAL
TRICHOMONAS, WET PREP: ABNORMAL
WBC'S/HIGH POWER FIELD, WET PREP: ABNORMAL
YEAST, WET PREP: PRESENT

## 2024-01-18 PROCEDURE — 76820 UMBILICAL ARTERY ECHO: CPT

## 2024-01-18 PROCEDURE — 99207 PR PRENATAL VISIT: CPT | Performed by: OBSTETRICS & GYNECOLOGY

## 2024-01-18 PROCEDURE — 76815 OB US LIMITED FETUS(S): CPT | Mod: 26 | Performed by: OBSTETRICS & GYNECOLOGY

## 2024-01-18 PROCEDURE — 87210 SMEAR WET MOUNT SALINE/INK: CPT | Performed by: OBSTETRICS & GYNECOLOGY

## 2024-01-18 PROCEDURE — 76820 UMBILICAL ARTERY ECHO: CPT | Mod: 26 | Performed by: OBSTETRICS & GYNECOLOGY

## 2024-01-18 PROCEDURE — 99213 OFFICE O/P EST LOW 20 MIN: CPT | Mod: 25 | Performed by: OBSTETRICS & GYNECOLOGY

## 2024-01-18 PROCEDURE — 59025 FETAL NON-STRESS TEST: CPT | Mod: 26 | Performed by: OBSTETRICS & GYNECOLOGY

## 2024-01-18 RX ORDER — FLUCONAZOLE 150 MG/1
150 TABLET ORAL ONCE
Qty: 1 TABLET | Refills: 0 | Status: SHIPPED | OUTPATIENT
Start: 2024-01-18 | End: 2024-01-18

## 2024-01-18 NOTE — PROGRESS NOTES
"Please see \"Imaging\" tab under \"Chart Review\" for details of today's US at the [facility].    Faraz Brenner MD  Maternal-Fetal Medicine    "

## 2024-01-18 NOTE — PROGRESS NOTES
Return OB visit    Subjective:  Patient reports active fetal movement, no vaginal bleeding or leaking fluid. She denies contractions. She expresses continued uncertainty regarding what to expect after delivery due to the fetal ventriculometry but overall coping well. Declines to meet with [facility] today but will follow up after delivery if needed. Patient does have increased vaginal discharge and itching.      Objective:  /72 (BP Location: Right arm, Patient Position: Sitting, Cuff Size: Adult Regular)   Pulse 96   Temp (!) 100.5  F (38.1  C)   Wt 55.2 kg (121 lb 12.8 oz)   LMP 2023   SpO2 100%   BMI 22.28 kg/m     See OB flow sheet    Component      Latest Ref Rng 2024  1:59 PM   Trichomonas      Absent  Absent    Yeast      Absent  Present !    Clue cells      Absent  Absent    WBCs/high power field      None  2+ !       Legend:  ! Abnormal  Assessment and Plan    Ofe Samuels is a 23 year old  at 36w4d here for KILO visit, pregnancy complicated by FGR and ventriculomegaly.     This visit:  (O09.93) Supervision of high risk pregnancy in third trimester  (primary encounter diagnosis)  -Reviewed plan for IOL on  and discussed expectations around IOL and pain control in labor. Reviewed bleeding precautions and FKC.   -GBS done at last visit and negative, Hgb 9.1 and first IV iron infusion scheduled for tomorrow   RTC for post partum visit   -Initial temp elevated but not repeated prior to leaving the office. Patient called at home and asked her to repeat her temp and call if >100.4. She denies feeling unwell. No subjective fever/chills.     (N89.8) Vaginal discharge  -Wet prep positive for vaginal candidasis, Rx sent to pharmacy for diflucan   Plan: Wet preparation, fluconazole (DIFLUCAN) 150 MG         tablet          Dispo: RTC for PP visit     Antonina Gongora MD

## 2024-01-18 NOTE — NURSING NOTE
Patient reports + fetal movement, no pain, no contractions, leaking of fluid, or bleeding. Reports some discomfort r/t pruritus on perineum.  Has OBV today following her MFM appt and will discuss w/primary provder. NST completed, see flowsheet,  given SBAR and reviewed fetal monitoring strip, see his note in Epic.

## 2024-01-18 NOTE — TELEPHONE ENCOUNTER
Spoke with patient, per Dr. Gongora, informed her that her lab results were positive for a yeast infection and that a prescription for Diflucan was sent to her pharmacy.    When patient presented to clinic, temperature was 100.5F and left before a repeat temperature could be taken. During phone call I instructed patient to repeat temperature at home, patient states she did not have a thermometer but would get one. Per Dr. Gongora, patient is to call back if her temperature is higher than 100.4f.  Patient verbalized understanding.    PATRICK LEES, LEONILAN   Sarecycline Counseling: Patient advised regarding possible photosensitivity and discoloration of the teeth, skin, lips, tongue and gums.  Patient instructed to avoid sunlight, if possible.  When exposed to sunlight, patients should wear protective clothing, sunglasses, and sunscreen.  The patient was instructed to call the office immediately if the following severe adverse effects occur:  hearing changes, easy bruising/bleeding, severe headache, or vision changes.  The patient verbalized understanding of the proper use and possible adverse effects of sarecycline.  All of the patient's questions and concerns were addressed.

## 2024-01-19 ENCOUNTER — INFUSION THERAPY VISIT (OUTPATIENT)
Dept: INFUSION THERAPY | Facility: CLINIC | Age: 24
End: 2024-01-19
Attending: OBSTETRICS & GYNECOLOGY
Payer: MEDICAID

## 2024-01-19 ENCOUNTER — PATIENT OUTREACH (OUTPATIENT)
Dept: CARE COORDINATION | Facility: CLINIC | Age: 24
End: 2024-01-19
Payer: MEDICAID

## 2024-01-19 VITALS
RESPIRATION RATE: 18 BRPM | HEART RATE: 93 BPM | SYSTOLIC BLOOD PRESSURE: 109 MMHG | OXYGEN SATURATION: 99 % | DIASTOLIC BLOOD PRESSURE: 68 MMHG

## 2024-01-19 DIAGNOSIS — D50.9 IRON DEFICIENCY ANEMIA OF MOTHER DURING PREGNANCY: Primary | ICD-10-CM

## 2024-01-19 DIAGNOSIS — O99.019 IRON DEFICIENCY ANEMIA OF MOTHER DURING PREGNANCY: Primary | ICD-10-CM

## 2024-01-19 PROCEDURE — 96365 THER/PROPH/DIAG IV INF INIT: CPT

## 2024-01-19 PROCEDURE — 250N000011 HC RX IP 250 OP 636: Performed by: OBSTETRICS & GYNECOLOGY

## 2024-01-19 PROCEDURE — 258N000003 HC RX IP 258 OP 636: Performed by: OBSTETRICS & GYNECOLOGY

## 2024-01-19 RX ORDER — ALBUTEROL SULFATE 0.83 MG/ML
2.5 SOLUTION RESPIRATORY (INHALATION)
OUTPATIENT
Start: 2024-01-21

## 2024-01-19 RX ORDER — EPINEPHRINE 1 MG/ML
0.3 INJECTION, SOLUTION, CONCENTRATE INTRAVENOUS EVERY 5 MIN PRN
OUTPATIENT
Start: 2024-01-21

## 2024-01-19 RX ORDER — HEPARIN SODIUM (PORCINE) LOCK FLUSH IV SOLN 100 UNIT/ML 100 UNIT/ML
5 SOLUTION INTRAVENOUS
OUTPATIENT
Start: 2024-01-21

## 2024-01-19 RX ORDER — HEPARIN SODIUM,PORCINE 10 UNIT/ML
5-20 VIAL (ML) INTRAVENOUS DAILY PRN
OUTPATIENT
Start: 2024-01-21

## 2024-01-19 RX ORDER — METHYLPREDNISOLONE SODIUM SUCCINATE 125 MG/2ML
125 INJECTION, POWDER, LYOPHILIZED, FOR SOLUTION INTRAMUSCULAR; INTRAVENOUS
Start: 2024-01-21

## 2024-01-19 RX ORDER — DIPHENHYDRAMINE HYDROCHLORIDE 50 MG/ML
50 INJECTION INTRAMUSCULAR; INTRAVENOUS
Start: 2024-01-21

## 2024-01-19 RX ORDER — MEPERIDINE HYDROCHLORIDE 25 MG/ML
25 INJECTION INTRAMUSCULAR; INTRAVENOUS; SUBCUTANEOUS EVERY 30 MIN PRN
OUTPATIENT
Start: 2024-01-21

## 2024-01-19 RX ORDER — ALBUTEROL SULFATE 90 UG/1
1-2 AEROSOL, METERED RESPIRATORY (INHALATION)
Start: 2024-01-21

## 2024-01-19 RX ADMIN — IRON SUCROSE 300 MG: 20 INJECTION, SOLUTION INTRAVENOUS at 13:45

## 2024-01-19 NOTE — PROGRESS NOTES
Infusion Nursing Note:  Ofe Samuels presents today for venofer infusion.    Patient seen by provider today: No   present during visit today: Not Applicable.    Note: Patient nervous regarding infusion. Reassurance provided. Education provided. Patient does not have questions at this time.      Intravenous Access:  Peripheral IV placed.    Treatment Conditions:  Not Applicable.      Post Infusion Assessment:  Patient tolerated infusion without incident.  Blood return noted pre and post infusion.  Site patent and intact, free from redness, edema or discomfort.  No evidence of extravasations.  Access discontinued per protocol.       Discharge Plan:   Discharge instructions reviewed with: Patient.  Patient and/or family verbalized understanding of discharge instructions and all questions answered.  Patient discharged in stable condition accompanied by: self.  Departure Mode: Ambulatory.      Crystal Bustamante RN

## 2024-01-21 ENCOUNTER — HOSPITAL ENCOUNTER (INPATIENT)
Facility: CLINIC | Age: 24
LOS: 3 days | Discharge: HOME | End: 2024-01-24
Attending: OBSTETRICS & GYNECOLOGY | Admitting: OBSTETRICS & GYNECOLOGY
Payer: MEDICAID

## 2024-01-21 ENCOUNTER — ANESTHESIA (OUTPATIENT)
Dept: OBGYN | Facility: CLINIC | Age: 24
End: 2024-01-21
Payer: MEDICAID

## 2024-01-21 ENCOUNTER — ANESTHESIA EVENT (OUTPATIENT)
Dept: OBGYN | Facility: CLINIC | Age: 24
End: 2024-01-21
Payer: MEDICAID

## 2024-01-21 DIAGNOSIS — O09.93 SUPERVISION OF HIGH RISK PREGNANCY IN THIRD TRIMESTER: Primary | ICD-10-CM

## 2024-01-21 PROBLEM — Z34.90 PREGNANCY: Status: ACTIVE | Noted: 2024-01-21

## 2024-01-21 LAB
ABO/RH(D): NORMAL
ANTIBODY SCREEN: NEGATIVE
BASOPHILS # BLD AUTO: 0 10E3/UL (ref 0–0.2)
BASOPHILS NFR BLD AUTO: 0 %
EOSINOPHIL # BLD AUTO: 0.1 10E3/UL (ref 0–0.7)
EOSINOPHIL NFR BLD AUTO: 1 %
ERYTHROCYTE [DISTWIDTH] IN BLOOD BY AUTOMATED COUNT: 13.6 % (ref 10–15)
HCT VFR BLD AUTO: 28 % (ref 35–47)
HGB BLD-MCNC: 8.6 G/DL (ref 11.7–15.7)
IMM GRANULOCYTES # BLD: 0.1 10E3/UL
IMM GRANULOCYTES NFR BLD: 1 %
LYMPHOCYTES # BLD AUTO: 1.7 10E3/UL (ref 0.8–5.3)
LYMPHOCYTES NFR BLD AUTO: 20 %
MCH RBC QN AUTO: 25.6 PG (ref 26.5–33)
MCHC RBC AUTO-ENTMCNC: 31.4 G/DL (ref 31.5–36.5)
MCV RBC AUTO: 83 FL (ref 78–100)
MONOCYTES # BLD AUTO: 0.8 10E3/UL (ref 0–1.3)
MONOCYTES NFR BLD AUTO: 9 %
NEUTROPHILS # BLD AUTO: 6 10E3/UL (ref 1.6–8.3)
NEUTROPHILS NFR BLD AUTO: 69 %
NRBC # BLD AUTO: 0 10E3/UL
NRBC BLD AUTO-RTO: 0 /100
PLATELET # BLD AUTO: 313 10E3/UL (ref 150–450)
RBC # BLD AUTO: 3.36 10E6/UL (ref 3.8–5.2)
SPECIMEN EXPIRATION DATE: NORMAL
T PALLIDUM AB SER QL: NONREACTIVE
WBC # BLD AUTO: 8.6 10E3/UL (ref 4–11)

## 2024-01-21 PROCEDURE — 86780 TREPONEMA PALLIDUM: CPT

## 2024-01-21 PROCEDURE — 120N000002 HC R&B MED SURG/OB UMMC

## 2024-01-21 PROCEDURE — 258N000003 HC RX IP 258 OP 636

## 2024-01-21 PROCEDURE — 250N000009 HC RX 250

## 2024-01-21 PROCEDURE — 86900 BLOOD TYPING SEROLOGIC ABO: CPT

## 2024-01-21 PROCEDURE — 99221 1ST HOSP IP/OBS SF/LOW 40: CPT | Mod: AI | Performed by: NURSE PRACTITIONER

## 2024-01-21 PROCEDURE — 99222 1ST HOSP IP/OBS MODERATE 55: CPT | Mod: 25 | Performed by: OBSTETRICS & GYNECOLOGY

## 2024-01-21 PROCEDURE — 59025 FETAL NON-STRESS TEST: CPT | Mod: 26 | Performed by: OBSTETRICS & GYNECOLOGY

## 2024-01-21 PROCEDURE — 85025 COMPLETE CBC W/AUTO DIFF WBC: CPT

## 2024-01-21 PROCEDURE — 370N000003 HC ANESTHESIA WARD SERVICE: Performed by: STUDENT IN AN ORGANIZED HEALTH CARE EDUCATION/TRAINING PROGRAM

## 2024-01-21 PROCEDURE — 36415 COLL VENOUS BLD VENIPUNCTURE: CPT

## 2024-01-21 PROCEDURE — 250N000013 HC RX MED GY IP 250 OP 250 PS 637

## 2024-01-21 RX ORDER — ACETAMINOPHEN 325 MG/1
650 TABLET ORAL EVERY 4 HOURS PRN
Status: DISCONTINUED | OUTPATIENT
Start: 2024-01-21 | End: 2024-01-22

## 2024-01-21 RX ORDER — OXYTOCIN 10 [USP'U]/ML
10 INJECTION, SOLUTION INTRAMUSCULAR; INTRAVENOUS
Status: DISCONTINUED | OUTPATIENT
Start: 2024-01-21 | End: 2024-01-22

## 2024-01-21 RX ORDER — OXYTOCIN/0.9 % SODIUM CHLORIDE 30/500 ML
1-24 PLASTIC BAG, INJECTION (ML) INTRAVENOUS CONTINUOUS
Status: DISCONTINUED | OUTPATIENT
Start: 2024-01-21 | End: 2024-01-22

## 2024-01-21 RX ORDER — FENTANYL CITRATE 50 UG/ML
50 INJECTION, SOLUTION INTRAMUSCULAR; INTRAVENOUS EVERY 30 MIN PRN
Status: DISCONTINUED | OUTPATIENT
Start: 2024-01-21 | End: 2024-01-22

## 2024-01-21 RX ORDER — OXYTOCIN 10 [USP'U]/ML
10 INJECTION, SOLUTION INTRAMUSCULAR; INTRAVENOUS
Status: DISCONTINUED | OUTPATIENT
Start: 2024-01-21 | End: 2024-01-24 | Stop reason: HOSPADM

## 2024-01-21 RX ORDER — TRANEXAMIC ACID 10 MG/ML
1 INJECTION, SOLUTION INTRAVENOUS EVERY 30 MIN PRN
Status: DISCONTINUED | OUTPATIENT
Start: 2024-01-21 | End: 2024-01-22

## 2024-01-21 RX ORDER — OXYTOCIN/0.9 % SODIUM CHLORIDE 30/500 ML
1-24 PLASTIC BAG, INJECTION (ML) INTRAVENOUS CONTINUOUS
Status: DISCONTINUED | OUTPATIENT
Start: 2024-01-21 | End: 2024-01-21

## 2024-01-21 RX ORDER — FENTANYL CITRATE-0.9 % NACL/PF 10 MCG/ML
100 PLASTIC BAG, INJECTION (ML) INTRAVENOUS EVERY 5 MIN PRN
Status: DISCONTINUED | OUTPATIENT
Start: 2024-01-21 | End: 2024-01-22

## 2024-01-21 RX ORDER — OXYTOCIN/0.9 % SODIUM CHLORIDE 30/500 ML
340 PLASTIC BAG, INJECTION (ML) INTRAVENOUS CONTINUOUS PRN
Status: COMPLETED | OUTPATIENT
Start: 2024-01-21 | End: 2024-01-22

## 2024-01-21 RX ORDER — ONDANSETRON 4 MG/1
4 TABLET, ORALLY DISINTEGRATING ORAL EVERY 6 HOURS PRN
Status: DISCONTINUED | OUTPATIENT
Start: 2024-01-21 | End: 2024-01-22

## 2024-01-21 RX ORDER — NALOXONE HYDROCHLORIDE 0.4 MG/ML
0.2 INJECTION, SOLUTION INTRAMUSCULAR; INTRAVENOUS; SUBCUTANEOUS
Status: DISCONTINUED | OUTPATIENT
Start: 2024-01-21 | End: 2024-01-22

## 2024-01-21 RX ORDER — METOCLOPRAMIDE 10 MG/1
10 TABLET ORAL EVERY 6 HOURS PRN
Status: DISCONTINUED | OUTPATIENT
Start: 2024-01-21 | End: 2024-01-22

## 2024-01-21 RX ORDER — IBUPROFEN 800 MG/1
800 TABLET, FILM COATED ORAL
Status: DISCONTINUED | OUTPATIENT
Start: 2024-01-21 | End: 2024-01-24 | Stop reason: HOSPADM

## 2024-01-21 RX ORDER — ONDANSETRON 2 MG/ML
4 INJECTION INTRAMUSCULAR; INTRAVENOUS EVERY 6 HOURS PRN
Status: DISCONTINUED | OUTPATIENT
Start: 2024-01-21 | End: 2024-01-22

## 2024-01-21 RX ORDER — METHYLERGONOVINE MALEATE 0.2 MG/ML
200 INJECTION INTRAVENOUS
Status: DISCONTINUED | OUTPATIENT
Start: 2024-01-21 | End: 2024-01-22

## 2024-01-21 RX ORDER — CITRIC ACID/SODIUM CITRATE 334-500MG
30 SOLUTION, ORAL ORAL
Status: DISCONTINUED | OUTPATIENT
Start: 2024-01-21 | End: 2024-01-22

## 2024-01-21 RX ORDER — KETOROLAC TROMETHAMINE 30 MG/ML
30 INJECTION, SOLUTION INTRAMUSCULAR; INTRAVENOUS
Status: DISCONTINUED | OUTPATIENT
Start: 2024-01-21 | End: 2024-01-24 | Stop reason: HOSPADM

## 2024-01-21 RX ORDER — LOPERAMIDE HCL 2 MG
2 CAPSULE ORAL
Status: DISCONTINUED | OUTPATIENT
Start: 2024-01-21 | End: 2024-01-22

## 2024-01-21 RX ORDER — MISOPROSTOL 200 UG/1
800 TABLET ORAL
Status: DISCONTINUED | OUTPATIENT
Start: 2024-01-21 | End: 2024-01-22

## 2024-01-21 RX ORDER — SODIUM CHLORIDE, SODIUM LACTATE, POTASSIUM CHLORIDE, CALCIUM CHLORIDE 600; 310; 30; 20 MG/100ML; MG/100ML; MG/100ML; MG/100ML
INJECTION, SOLUTION INTRAVENOUS CONTINUOUS PRN
Status: DISCONTINUED | OUTPATIENT
Start: 2024-01-21 | End: 2024-01-22

## 2024-01-21 RX ORDER — OXYTOCIN/0.9 % SODIUM CHLORIDE 30/500 ML
100-340 PLASTIC BAG, INJECTION (ML) INTRAVENOUS CONTINUOUS PRN
Status: DISCONTINUED | OUTPATIENT
Start: 2024-01-21 | End: 2024-01-24 | Stop reason: HOSPADM

## 2024-01-21 RX ORDER — METOCLOPRAMIDE HYDROCHLORIDE 5 MG/ML
10 INJECTION INTRAMUSCULAR; INTRAVENOUS EVERY 6 HOURS PRN
Status: DISCONTINUED | OUTPATIENT
Start: 2024-01-21 | End: 2024-01-22

## 2024-01-21 RX ORDER — CARBOPROST TROMETHAMINE 250 UG/ML
250 INJECTION, SOLUTION INTRAMUSCULAR
Status: DISCONTINUED | OUTPATIENT
Start: 2024-01-21 | End: 2024-01-22

## 2024-01-21 RX ORDER — MISOPROSTOL 100 UG/1
25 TABLET ORAL
Status: DISCONTINUED | OUTPATIENT
Start: 2024-01-21 | End: 2024-01-21

## 2024-01-21 RX ORDER — NALOXONE HYDROCHLORIDE 0.4 MG/ML
0.4 INJECTION, SOLUTION INTRAMUSCULAR; INTRAVENOUS; SUBCUTANEOUS
Status: DISCONTINUED | OUTPATIENT
Start: 2024-01-21 | End: 2024-01-22

## 2024-01-21 RX ORDER — LIDOCAINE 40 MG/G
CREAM TOPICAL
Status: DISCONTINUED | OUTPATIENT
Start: 2024-01-21 | End: 2024-01-22

## 2024-01-21 RX ORDER — NALBUPHINE HYDROCHLORIDE 20 MG/ML
2.5-5 INJECTION, SOLUTION INTRAMUSCULAR; INTRAVENOUS; SUBCUTANEOUS EVERY 6 HOURS PRN
Status: DISCONTINUED | OUTPATIENT
Start: 2024-01-21 | End: 2024-01-24 | Stop reason: HOSPADM

## 2024-01-21 RX ORDER — PROCHLORPERAZINE 25 MG
25 SUPPOSITORY, RECTAL RECTAL EVERY 12 HOURS PRN
Status: DISCONTINUED | OUTPATIENT
Start: 2024-01-21 | End: 2024-01-22

## 2024-01-21 RX ORDER — MISOPROSTOL 200 UG/1
400 TABLET ORAL
Status: DISCONTINUED | OUTPATIENT
Start: 2024-01-21 | End: 2024-01-22

## 2024-01-21 RX ORDER — PROCHLORPERAZINE MALEATE 10 MG
10 TABLET ORAL EVERY 6 HOURS PRN
Status: DISCONTINUED | OUTPATIENT
Start: 2024-01-21 | End: 2024-01-22

## 2024-01-21 RX ORDER — FENTANYL/ROPIVACAINE/NS/PF 2MCG/ML-.1
PLASTIC BAG, INJECTION (ML) EPIDURAL
Status: DISCONTINUED | OUTPATIENT
Start: 2024-01-21 | End: 2024-01-22

## 2024-01-21 RX ORDER — FLUCONAZOLE 150 MG/1
150 TABLET ORAL ONCE
Qty: 1 TABLET | Refills: 0 | Status: COMPLETED | OUTPATIENT
Start: 2024-01-21 | End: 2024-01-21

## 2024-01-21 RX ORDER — LOPERAMIDE HCL 2 MG
4 CAPSULE ORAL
Status: DISCONTINUED | OUTPATIENT
Start: 2024-01-21 | End: 2024-01-22

## 2024-01-21 RX ADMIN — FLUCONAZOLE 150 MG: 150 TABLET ORAL at 11:03

## 2024-01-21 RX ADMIN — SODIUM CHLORIDE, POTASSIUM CHLORIDE, SODIUM LACTATE AND CALCIUM CHLORIDE: 600; 310; 30; 20 INJECTION, SOLUTION INTRAVENOUS at 19:00

## 2024-01-21 RX ADMIN — Medication 1 MILLI-UNITS/MIN: at 22:50

## 2024-01-21 RX ADMIN — MISOPROSTOL 25 MCG: 100 TABLET ORAL at 12:46

## 2024-01-21 RX ADMIN — MISOPROSTOL 25 MCG: 100 TABLET ORAL at 10:36

## 2024-01-21 ASSESSMENT — ACTIVITIES OF DAILY LIVING (ADL)
ADLS_ACUITY_SCORE: 18
ADLS_ACUITY_SCORE: 20
ADLS_ACUITY_SCORE: 18
ADLS_ACUITY_SCORE: 18

## 2024-01-21 NOTE — ANESTHESIA PREPROCEDURE EVALUATION
"Anesthesia Pre-Procedure Evaluation    Patient: Ofe Samuels   MRN: [id number] : 2000        Procedure : * No procedures listed *          Past Medical History:   Diagnosis Date    Known health problems: none       Past Surgical History:   Procedure Laterality Date    NO HISTORY OF SURGERY        No Known Allergies   Social History     Tobacco Use    Smoking status: Never     Passive exposure: Never    Smokeless tobacco: Never   Substance Use Topics    Alcohol use: Never      Wt Readings from Last 1 Encounters:   24 55.2 kg (121 lb 12.8 oz)        Anesthesia Evaluation   Pt has not had prior anesthetic     No history of anesthetic complications       ROS/MED HX  ENT/Pulmonary:  - neg pulmonary ROS     Neurologic:  - neg neurologic ROS     Cardiovascular:  - neg cardiovascular ROS     METS/Exercise Tolerance:     Hematologic:  - neg hematologic  ROS     Musculoskeletal:       GI/Hepatic:  - neg GI/hepatic ROS     Renal/Genitourinary:       Endo:  - neg endo ROS     Psychiatric/Substance Use:  - neg psychiatric ROS     Infectious Disease:       Malignancy:       Other:   - sFGR  - Fetal ventriculomegaly  - Anemia  - Inconsistent PNC  - Vaginal Candidiasis    (-) previous  and TOLAC candidate       Physical Exam    Airway        Mallampati: IV   TM distance: > 3 FB   Neck ROM: full   Mouth opening: > 3 cm    Respiratory Devices and Support         Dental  no notable dental history         Cardiovascular   cardiovascular exam normal          Pulmonary   pulmonary exam normal                OUTSIDE LABS:  CBC:   Lab Results   Component Value Date    WBC 8.6 2024    HGB 8.6 (L) 2024    HGB 9.1 (L) 2024    HGB 9.1 (L) 2024    HCT 28.3 (L) 2024     2024     BMP: No results found for: \"NA\", \"POTASSIUM\", \"CHLORIDE\", \"CO2\", \"BUN\", \"CR\", \"GLC\"  COAGS: No results found for: \"PTT\", \"INR\", \"FIBR\"  POC: No results found for: \"BGM\", \"HCG\", \"HCGS\"  HEPATIC: No results " "found for: \"ALBUMIN\", \"PROTTOTAL\", \"ALT\", \"AST\", \"GGT\", \"ALKPHOS\", \"BILITOTAL\", \"BILIDIRECT\", \"DONNELL\"  OTHER: No results found for: \"PH\", \"LACT\", \"A1C\", \"CARLOS\", \"PHOS\", \"MAG\", \"LIPASE\", \"AMYLASE\", \"TSH\", \"T4\", \"T3\", \"CRP\", \"SED\"    Anesthesia Plan    ASA Status:  2       Anesthesia Type: Epidural.              Consents    Anesthesia Plan(s) and associated risks, benefits, and realistic alternatives discussed. Questions answered and patient/representative(s) expressed understanding.     - Discussed:     - Discussed with:  Patient            Postoperative Care            Comments:    Other Comments: Benefits and risk of epidural analgesia were discussed including risk of bleeding, infection, damage to surrounding structures, headache, or malfunctioning epidural. Additionally the overall concept of GETA for emergent CS was discussed.             Barrett Castellanos MD    I have reviewed the pertinent notes and labs in the chart from the past 30 days and (re)examined the patient.  Any updates or changes from those notes are reflected in this note.                  "

## 2024-01-21 NOTE — H&P
[facility]  Labor & Delivery History and Physical   2024  Ofe Samuels  [id number]      HPI:   Ofe Samuels is a 23 year old  at 37w0d by LMP c/w 8w6d US who presents for IOL for severe fetal growth restriction and fetal ventriculomegaly. Pregnancy complicated as below.    She states that she is feeling well today.  Denies leakage of fluids or vaginal bleeding. Good fetal movement. Denies contractions. She denies fever, chills headache, vision changes, loss of taste/smell, cough, sore throat, chest pain, shortness of breath, RUQ pain, nausea, vomiting, dysuria, constipation, diarrhea, increased edema.    Pregnancy complicated by:  - sFGR  - Fetal ventriculomegaly  - Anemia  - Inconsistent PNC  - Vaginal Candidiasis    OBHX:   OB History    Para Term  AB Living   2 0 0 0 1 0   SAB IAB Ectopic Multiple Live Births   1 0 0 0 0      # Outcome Date GA Lbr Jacek/2nd Weight Sex Delivery Anes PTL Lv   2 Current            1 2021 4w0d    SAB   FD       Past Medical History:   Diagnosis Date    Known health problems: none        Past Surgical History:   Procedure Laterality Date    NO HISTORY OF SURGERY         Medications:   No current facility-administered medications on file prior to encounter.  ferrous sulfate (FEROSUL) 325 (65 Fe) MG tablet, Take 1 tablet by mouth 2 times daily (Patient not taking: Reported on 2024)  Prenatal Vit-Fe Fumarate-FA (PRENATAL VITAMINS) 28-0.8 MG TABS, Take 1 tablet by mouth daily at 2 pm (Patient not taking: Reported on 2024)  VITAMIN  B-6 25 MG tablet, Take 1 tablet by mouth 3 times daily (Patient not taking: Reported on 2024)        No Known Allergies    No family history on file.    SocialHX:   Social History     Tobacco Use    Smoking status: Never     Passive exposure: Never    Smokeless tobacco: Never   Vaping Use    Vaping Use: Never used   Substance Use Topics    Alcohol use: Never    Drug use: Never       ROS:  "10-point ROS negative except as indicated in HPI.    Physical Exam:  Vitals:    01/21/24 0815   BP: 107/59   Resp: 16   Temp: 98.2  F (36.8  C)     General: alert, oriented female, resting in bed in NAD  CV: regular rate and rhythm  Lungs: clear bilaterally, no crackles or wheezes  Abdomen: soft, gravid, non-tender, EFW 6#.  Extremities: bilateral lower extremities non-tender with no edema    SVE: 3/50/-1  Membranes: intact    Presentation: cephalic by BSUS    FHT  Baseline: 130  Variability: Moderate  Accels: Present  Decels: Absent  [place]: 1 in 10 minutes    Prenatal imaging    1/16/24 MR:   IMPRESSION:  1. The corpus callosum is present, however given moderate lateral  ventriculomegaly with colpocephaly, difficult to exclude dysgenesis.  No additional brain abnormality visualized.  2. Large distention of the urinary bladder throughout the exam.  3. Incidental maternal cystic lesion adjacent to the right colon, at  the level of the inferior pole right kidney. Recommend follow-up  ultrasound.    1/04/24 [facility] US  IMPRESSION  ---------------------------------------------------------------------------------------------------------  1) Growth parameters and estimated fetal weight were consistent with asymmetric intrauterine growth restriction.  2) There is severe bilateral ventriculomegaly. CNS anatomy appears otherwise normal.  3) The right foot was not adequately visualized due to fetal position.  4) The fetal anatomy was otherwise adequately visualized and appeared normal.  5) None of the other anomalies commonly detected by ultrasound were evident.  6) Normal amniotic fluid volume.  7) Normal umbilical artery doppler flow studies.  8) Reactive NST without decelerations.    Prenatal Labs:   Lab Results   Component Value Date    AS Negative 01/04/2024    HEPBANG Nonreactive 01/04/2024    HGB 8.6 (L) 01/21/2024       GBS Status:   Negative    No results found for: \"PAP\"    Labs:   Results for orders placed or performed " during the hospital encounter of 24 (from the past 24 hour(s))   Hemoglobin   Result Value Ref Range    Hemoglobin 8.6 (L) 11.7 - 15.7 g/dL   ABO/Rh type and screen    Narrative    The following orders were created for panel order ABO/Rh type and screen.  Procedure                               Abnormality         Status                     ---------                               -----------         ------                     Adult Type and Screen[[id number]]                            Preliminary result           Please view results for these tests on the individual orders.   Adult Type and Screen   Result Value Ref Range    ABO/RH(D) O POS     SPECIMEN EXPIRATION DATE [id number]        A/P:   23 year old  at 37w0d by LMP c/w 8w US who presents for IOL for sFGR and b/l ventriculomegaly. Pregnancy complicated as below:    #IOL  - Admit to labor and delivery for IOL  - SVE: 3/50/-1  - Pain: plans epidural  - GBS neg  - Plan: Given this is patient's first vaginal delivery and cervix is still thick will plan on starting with a dose of PO misoprostol and plan to start pitocin per protocol afterwards    #Fetal Growth Restriction  - EFW 2%ile, AC 3%ile  - UAD and BPPs have been wnl    #Fetal b/l Ventriculomegaly  - s/p fetal MR  - NICU consult ordered    #Vaginal Candidiasis  - Diagnosed on  but pt was unable to get abx from pharmacy  - Will order fluconazole    #Fetal Well Being  - Category I FHT  - Continuous EFM  - Interventions PRN  - EFW 4#    #PNC:     - BMI 22.28  - Rh positive, Rubella immune, GCT wnl (per patient report)  - Hep B Antigen neg  - GBS neg  - Other infectious labs neg  - Placenta:   - Immunization: s/p TDAP  - Pap: unk  - Feed: breast  - BC: will discuss    Patient seen and for IOLcare plan discussed under supervision of Dr. Pike.    Maria Luisa Levin MD  Obstetrics & Gynecology, PGY-1  2024 11:15 AM    Physician Attestation   I, Esther Pike MD, personally examined  and evaluated this patient.  I discussed the patient with the resident and care team, and agree with the assessment and plan of care as documented in the note above.   I personally reviewed vital signs, medications, labs, fetal heart tones and toco.  Key findings: Patient is here patient is here for IOL. Fetal status is reassuring with a reactive NST. Cervix is dilated but not soft, offered start with miso x 1 dose. Patient preferred oral.  Will reassess ln 2 hours.   Esther Pike MD   January 21, 2024

## 2024-01-21 NOTE — PROGRESS NOTES
[facility]  Labor Progress Note    Subjective:  Patient feeling contractions rarely. Otherwise feels well.     Objective:   Patient Vitals for the past 4 hrs:   BP Temp Temp src Resp   24 1354 104/55 98.4  F (36.9  C) Oral 16     SVE: 3/50/-3 (unchanged)  Membranes: intact    FHT: Baseline 130, moderate variability, accelerations present, no decelerations  [place]: 1 contractions in 10 minutes    Assessment/Plan:  Ofe Samuels is a 23 year old  at 37w0d by LMP c/w 8w US, here for induction of labor in s/o severe FGR and ventriculomegaly.    Labor:  - Ripening: s/p PO miso x1. Will place 2nd dose of misoprostol vaginally.  - Pain management: Desires epidural for analgesia  - FWB: Cat 1, reactive and reassuring. Continue EFM and toco.    Maria Luisa Levin MD  Obstetrics & Gynecology, PGY-1  2024 1245    Present at bedside for assessment.   Procedure: vaginal miso placement  Cervix no change, membranes swept gently   cytotec placed in posterior fornix at 12:40 without complication.  Patient tolerated it well.      Esther Pike MD

## 2024-01-21 NOTE — PLAN OF CARE
Goal Outcome Evaluation:Pt admitted for IOL due to FGR. Pt very anxious, asking questions appropriately and all answered and encouraged. FOB at bedside and very protective of patient. Teaching done re induction techniques, labor, pain management options, breastfeeding and plan of care during hospitalization. VSS. 2 doses of Cytotec given - one p.o. and one vaginal per Dr. Pike.

## 2024-01-21 NOTE — PROVIDER NOTIFICATION
01/21/24 1723   Provider Notification   Provider Name/Title [facility]   Notification Reason Uterine Activity     Discussed uterine activity. Contractions q 1-3. Last misoprostol greater than 4 hrs ago. EFM category 1. Per MD, encourage oral hydration and continue with repositioning at this time.

## 2024-01-22 PROCEDURE — 250N000011 HC RX IP 250 OP 636: Performed by: STUDENT IN AN ORGANIZED HEALTH CARE EDUCATION/TRAINING PROGRAM

## 2024-01-22 PROCEDURE — 250N000011 HC RX IP 250 OP 636

## 2024-01-22 PROCEDURE — 999N000016 HC STATISTIC ATTENDANCE AT DELIVERY

## 2024-01-22 PROCEDURE — 3E033VJ INTRODUCTION OF OTHER HORMONE INTO PERIPHERAL VEIN, PERCUTANEOUS APPROACH: ICD-10-PCS | Performed by: OBSTETRICS & GYNECOLOGY

## 2024-01-22 PROCEDURE — 250N000013 HC RX MED GY IP 250 OP 250 PS 637

## 2024-01-22 PROCEDURE — 722N000001 HC LABOR CARE VAGINAL DELIVERY SINGLE

## 2024-01-22 PROCEDURE — 00HU33Z INSERTION OF INFUSION DEVICE INTO SPINAL CANAL, PERCUTANEOUS APPROACH: ICD-10-PCS | Performed by: STUDENT IN AN ORGANIZED HEALTH CARE EDUCATION/TRAINING PROGRAM

## 2024-01-22 PROCEDURE — 250N000009 HC RX 250

## 2024-01-22 PROCEDURE — 120N000002 HC R&B MED SURG/OB UMMC

## 2024-01-22 PROCEDURE — 10907ZC DRAINAGE OF AMNIOTIC FLUID, THERAPEUTIC FROM PRODUCTS OF CONCEPTION, VIA NATURAL OR ARTIFICIAL OPENING: ICD-10-PCS | Performed by: OBSTETRICS & GYNECOLOGY

## 2024-01-22 PROCEDURE — 258N000003 HC RX IP 258 OP 636

## 2024-01-22 PROCEDURE — 59410 OBSTETRICAL CARE: CPT | Mod: GC | Performed by: OBSTETRICS & GYNECOLOGY

## 2024-01-22 RX ORDER — OXYTOCIN 10 [USP'U]/ML
10 INJECTION, SOLUTION INTRAMUSCULAR; INTRAVENOUS
Status: DISCONTINUED | OUTPATIENT
Start: 2024-01-22 | End: 2024-01-24 | Stop reason: HOSPADM

## 2024-01-22 RX ORDER — ERYTHROMYCIN 5 MG/G
OINTMENT OPHTHALMIC
Status: DISCONTINUED
Start: 2024-01-22 | End: 2024-01-22 | Stop reason: HOSPADM

## 2024-01-22 RX ORDER — MODIFIED LANOLIN
OINTMENT (GRAM) TOPICAL
Status: DISCONTINUED | OUTPATIENT
Start: 2024-01-22 | End: 2024-01-24 | Stop reason: HOSPADM

## 2024-01-22 RX ORDER — HYDROCORTISONE 25 MG/G
CREAM TOPICAL 3 TIMES DAILY PRN
Status: DISCONTINUED | OUTPATIENT
Start: 2024-01-22 | End: 2024-01-24 | Stop reason: HOSPADM

## 2024-01-22 RX ORDER — DOCUSATE SODIUM 100 MG/1
100 CAPSULE, LIQUID FILLED ORAL DAILY
Status: DISCONTINUED | OUTPATIENT
Start: 2024-01-22 | End: 2024-01-24 | Stop reason: HOSPADM

## 2024-01-22 RX ORDER — ONDANSETRON 4 MG/1
4 TABLET, FILM COATED ORAL EVERY 6 HOURS PRN
Status: DISCONTINUED | OUTPATIENT
Start: 2024-01-22 | End: 2024-01-24 | Stop reason: HOSPADM

## 2024-01-22 RX ORDER — FENTANYL CITRATE 50 UG/ML
100 INJECTION, SOLUTION INTRAMUSCULAR; INTRAVENOUS ONCE
Status: COMPLETED | OUTPATIENT
Start: 2024-01-22 | End: 2024-01-22

## 2024-01-22 RX ORDER — METOCLOPRAMIDE 10 MG/1
10 TABLET ORAL EVERY 6 HOURS PRN
Status: DISCONTINUED | OUTPATIENT
Start: 2024-01-22 | End: 2024-01-24 | Stop reason: HOSPADM

## 2024-01-22 RX ORDER — MISOPROSTOL 200 UG/1
400 TABLET ORAL
Status: DISCONTINUED | OUTPATIENT
Start: 2024-01-22 | End: 2024-01-24 | Stop reason: HOSPADM

## 2024-01-22 RX ORDER — OXYTOCIN/0.9 % SODIUM CHLORIDE 30/500 ML
340 PLASTIC BAG, INJECTION (ML) INTRAVENOUS CONTINUOUS PRN
Status: DISCONTINUED | OUTPATIENT
Start: 2024-01-22 | End: 2024-01-24 | Stop reason: HOSPADM

## 2024-01-22 RX ORDER — BISACODYL 10 MG
10 SUPPOSITORY, RECTAL RECTAL DAILY PRN
Status: DISCONTINUED | OUTPATIENT
Start: 2024-01-22 | End: 2024-01-24 | Stop reason: HOSPADM

## 2024-01-22 RX ORDER — BUPIVACAINE HYDROCHLORIDE 2.5 MG/ML
INJECTION, SOLUTION EPIDURAL; INFILTRATION; INTRACAUDAL
Status: COMPLETED | OUTPATIENT
Start: 2024-01-22 | End: 2024-01-22

## 2024-01-22 RX ORDER — PHYTONADIONE 1 MG/.5ML
INJECTION, EMULSION INTRAMUSCULAR; INTRAVENOUS; SUBCUTANEOUS
Status: DISCONTINUED
Start: 2024-01-22 | End: 2024-01-22 | Stop reason: HOSPADM

## 2024-01-22 RX ORDER — LOPERAMIDE HCL 2 MG
4 CAPSULE ORAL
Status: DISCONTINUED | OUTPATIENT
Start: 2024-01-22 | End: 2024-01-24 | Stop reason: HOSPADM

## 2024-01-22 RX ORDER — CARBOPROST TROMETHAMINE 250 UG/ML
250 INJECTION, SOLUTION INTRAMUSCULAR
Status: DISCONTINUED | OUTPATIENT
Start: 2024-01-22 | End: 2024-01-24 | Stop reason: HOSPADM

## 2024-01-22 RX ORDER — ACETAMINOPHEN 325 MG/1
650 TABLET ORAL EVERY 4 HOURS PRN
Status: DISCONTINUED | OUTPATIENT
Start: 2024-01-22 | End: 2024-01-22

## 2024-01-22 RX ORDER — MISOPROSTOL 200 UG/1
800 TABLET ORAL
Status: DISCONTINUED | OUTPATIENT
Start: 2024-01-22 | End: 2024-01-24 | Stop reason: HOSPADM

## 2024-01-22 RX ORDER — TRANEXAMIC ACID 10 MG/ML
1 INJECTION, SOLUTION INTRAVENOUS EVERY 30 MIN PRN
Status: DISCONTINUED | OUTPATIENT
Start: 2024-01-22 | End: 2024-01-24 | Stop reason: HOSPADM

## 2024-01-22 RX ORDER — LOPERAMIDE HCL 2 MG
2 CAPSULE ORAL
Status: DISCONTINUED | OUTPATIENT
Start: 2024-01-22 | End: 2024-01-24 | Stop reason: HOSPADM

## 2024-01-22 RX ORDER — FENTANYL CITRATE 50 UG/ML
INJECTION, SOLUTION INTRAMUSCULAR; INTRAVENOUS
Status: COMPLETED | OUTPATIENT
Start: 2024-01-22 | End: 2024-01-22

## 2024-01-22 RX ORDER — OXYTOCIN/0.9 % SODIUM CHLORIDE 30/500 ML
PLASTIC BAG, INJECTION (ML) INTRAVENOUS
Status: DISCONTINUED
Start: 2024-01-22 | End: 2024-01-22 | Stop reason: WASHOUT

## 2024-01-22 RX ORDER — MISOPROSTOL 200 UG/1
TABLET ORAL
Status: DISCONTINUED
Start: 2024-01-22 | End: 2024-01-22 | Stop reason: WASHOUT

## 2024-01-22 RX ORDER — METHYLERGONOVINE MALEATE 0.2 MG/ML
200 INJECTION INTRAVENOUS
Status: DISCONTINUED | OUTPATIENT
Start: 2024-01-22 | End: 2024-01-24 | Stop reason: HOSPADM

## 2024-01-22 RX ORDER — LIDOCAINE HYDROCHLORIDE 10 MG/ML
INJECTION, SOLUTION EPIDURAL; INFILTRATION; INTRACAUDAL; PERINEURAL
Status: DISCONTINUED
Start: 2024-01-22 | End: 2024-01-22 | Stop reason: WASHOUT

## 2024-01-22 RX ORDER — IBUPROFEN 800 MG/1
800 TABLET, FILM COATED ORAL EVERY 6 HOURS PRN
Status: DISCONTINUED | OUTPATIENT
Start: 2024-01-22 | End: 2024-01-24 | Stop reason: HOSPADM

## 2024-01-22 RX ADMIN — IBUPROFEN 800 MG: 800 TABLET, FILM COATED ORAL at 11:13

## 2024-01-22 RX ADMIN — SODIUM CHLORIDE, POTASSIUM CHLORIDE, SODIUM LACTATE AND CALCIUM CHLORIDE: 600; 310; 30; 20 INJECTION, SOLUTION INTRAVENOUS at 03:10

## 2024-01-22 RX ADMIN — SODIUM CHLORIDE, POTASSIUM CHLORIDE, SODIUM LACTATE AND CALCIUM CHLORIDE: 600; 310; 30; 20 INJECTION, SOLUTION INTRAVENOUS at 08:05

## 2024-01-22 RX ADMIN — FENTANYL CITRATE 50 MCG: 50 INJECTION INTRAMUSCULAR; INTRAVENOUS at 03:17

## 2024-01-22 RX ADMIN — FENTANYL CITRATE 50 MCG: 50 INJECTION INTRAMUSCULAR; INTRAVENOUS at 06:35

## 2024-01-22 RX ADMIN — Medication: at 08:40

## 2024-01-22 RX ADMIN — BUPIVACAINE HYDROCHLORIDE 0.8 ML: 2.5 INJECTION, SOLUTION EPIDURAL; INFILTRATION; INTRACAUDAL at 08:31

## 2024-01-22 RX ADMIN — FENTANYL CITRATE 10 MCG: 50 INJECTION INTRAMUSCULAR; INTRAVENOUS at 08:31

## 2024-01-22 RX ADMIN — IBUPROFEN 800 MG: 800 TABLET, FILM COATED ORAL at 23:04

## 2024-01-22 RX ADMIN — FENTANYL CITRATE 100 MCG: 50 INJECTION INTRAMUSCULAR; INTRAVENOUS at 08:01

## 2024-01-22 RX ADMIN — Medication 340 ML/HR: at 10:09

## 2024-01-22 ASSESSMENT — ACTIVITIES OF DAILY LIVING (ADL)
ADLS_ACUITY_SCORE: 18

## 2024-01-22 NOTE — CONSULTS
Focused antepartum consult in regards to immediate delivery room plan related to known fetal ventriculomegaly. Reviewed with family that there is not an immediate plan for infant to be admitted to the NICU. NICU delivery team will attend the delivery and assess the infant. If she is stable at the time of delivery with appropriate vital signs and weight she can remain with parents on NFCC and a head ultrasound will be obtained prior to discharge. All parent questions answered.     Ofe Samuels is a 23 year old female.  who is 37.0 weeks pregnant with a fetus with known IUGR and ventriculomegaly. She is being induced due to this. She has had limited prenatal care this pregnancy.     JEANNE Whalen, NNP-BC 2024 6:31 PM

## 2024-01-22 NOTE — PROGRESS NOTES
[facility]  Labor Progress Note    Subjective:  Patient continues to feel contractions, but they are not very painful. Overall, pt is feeling fine. Reviewed starting pitocin.    Objective:   Patient Vitals for the past 4 hrs:   BP Temp Temp src Resp   24 1934 110/54 98.2  F (36.8  C) Oral 16   24 1818 96/55 98.3  F (36.8  C) Oral 16     SVE: 3-4/60/-2 (2208) per G3, chaperoned by RN and staff  Membranes: intact    FHT: Baseline 135, moderate variability, accelerations present, no decelerations  [place]: 3-4 contractions in 10 minutes    Assessment/Plan:  Ofe Samuels is a 23 year old  at 37w0d, here for IOL in s/o sFGR and ventriculomegaly.    Labor:  - s/p PV misoprostol  - Pitocin and AROM PRN; discussed starting pitocin d/t lack of cervical changes (low-dose protocol ordered given ctx frequency per RN; may increase rate per usual protocol if ctx pattern allows)  - SVE: 3/50/-2 (0940, 1245)> miso x2> 3-4/50/-2 (1715)> 3-4/60/-2 (2208)> pit to start now  - Pain management: Plans epidural for analgesia  - FWB: Cat 1, reactive and reassuring. Continue EFM and toco.    Alejandrina Montalvo, MS3    I was present with the student who participated in the service and in the documentation of the note. I have verified the history and personally performed the physical exam and medical decision-making. I agree with the assessment and plan of care as documented in the note.    Shruthi Casanova MD, PGY-3  10:24 PM  [facility] Obstetrics & Gynecology Residency

## 2024-01-22 NOTE — PLAN OF CARE
Goal Outcome Evaluation:       Assumed care at 0800, patient was in a lot of pain. 100 mcg Iv fentanyl was giving while awaiting for epidural placement. Fetal decel was noticed, provider was called into the room. Patient's SVE was 8 cm dilated. She got her epidural with good pain control. Patient was complete and started pushing at 0924, she delivered a viable baby girl at 1007. Both mom and infant are recovering well. Continue wit plan of care.

## 2024-01-22 NOTE — PLAN OF CARE
Data: Ofe Samuels transferred to [facility] via wheelchair at 1300. Baby transferred via parent's arms.  Action: Receiving unit notified of transfer: Yes. Patient and family notified of room change. Report given to Susana at 1310. Belongings sent to receiving unit. Accompanied by Registered Nurse. Oriented patient to surroundings. Call light within reach. ID bands double-checked with receiving RN.  Response: Patient tolerated transfer and is stable.Goal Outcome Evaluation:

## 2024-01-22 NOTE — PLAN OF CARE
Patient arrived to [facility] unit via wheelchair at 1300 ,with belongings, accompanied by spouse/ significant other, with infant in arms. Received report from  WILFREDO Wu  and checked bands. Unit and room orientation completd. Call light given; no concerns present at this time. Continue with plan of care.

## 2024-01-22 NOTE — DISCHARGE SUMMARY
VAGINAL DELIVERY DISCHARGE SUMMARY    Ofe Samuels  : 2000  MRN: [id number]    Admit date: 2024  Discharge date: 2024     Admit Dx:   - 23 year old  at 37w1d   - sFGR  - Fetal ventriculomegaly  - Anemia  - Inconsistent PNC  - Vaginal Candidiasis    Discharge Dx:  - Same as above, s/p     Procedures:  - Spontaneous vaginal delivery  - Epidural analgesia    Admit HPI:  Ms. Ofe Samuels is a 23 year old  at 37w0d by LMP c/w 8w US who presents for IOL for sFGR and b/l ventriculomegaly. Please see her admit H&P for full details of her PMH, PSH, Meds, Allergies and exam on admit.    Consults:  Anesthesia  Lactation    Hospital course:  Ofe Samuels was admitted to the hospital on 2024 for the above listed indications. She received an epidural for pain. She received PV misoprostol for cervical ripening. Labor was augmented with Pitocin and AROM. She was GBS negative and did not require antibiotic prophylaxis. She progressed to complete and pushed for 42 minutes. She delivered a viable female infant at 10:07 AM on 24. Shoulders delivered easily. Infant with spontaneous cry. Placed on mother's abdomen. APGARs 8 and 9 at 1 and 5 minutes. Weight 2300 grams. NICU was present for delivery due to severe FGR. Placenta delivered with gentle cord traction, and noted to be intact with 3 vessel cord. She sustained no lacerations. She received IV Pitocin for uterotonics. Fundus firm and lochia minimal at the end of the case. EBL 50 mL.     Her postpartum course was uncomplicated. On PPD#2, she was meeting all of her postpartum goals and deemed stable for discharge. She was voiding without difficulty, tolerating a regular diet without nausea and vomiting, her pain was well controlled on oral pain medicines and her lochia was appropriate. Her hemoglobin prior to delivery was 8.6 and after delivery was 8.4. She received IV iron prior to discharge. Her Rh status was positive, and Rhogam was not  indicated.     HGB  Recent Labs   Lab 24  0900 24  1030   HGB 8.4* 8.6*       Discharge Medications:     Review of your medicines        START taking        Dose / Directions   acetaminophen 325 MG tablet  Commonly known as: TYLENOL  Used for:  (spontaneous vaginal delivery)      Dose: 650 mg  Take 2 tablets (650 mg) by mouth every 6 hours as needed for mild pain Start after Delivery.  Quantity: 100 tablet  Refills: 0     ibuprofen 600 MG tablet  Commonly known as: ADVIL/MOTRIN  Used for:  (spontaneous vaginal delivery)      Dose: 600 mg  Take 1 tablet (600 mg) by mouth every 6 hours as needed for moderate pain Start after delivery  Quantity: 60 tablet  Refills: 0     SENNA-docusate sodium 8.6-50 MG tablet  Commonly known as: SENNA S  Used for:  (spontaneous vaginal delivery)      Dose: 1 tablet  Take 1 tablet by mouth at bedtime  Quantity: 100 tablet  Refills: 0            CONTINUE these medicines which may have CHANGED, or have new prescriptions. If we are uncertain of the size of tablets/capsules you have at home, strength may be listed as something that might have changed.        Dose / Directions   ferrous sulfate 325 (65 Fe) MG tablet  Commonly known as: FEROSUL  This may have changed: when to take this  Used for:  (spontaneous vaginal delivery)      Dose: 325 mg  Take 1 tablet (325 mg) by mouth daily (with breakfast)  Quantity: 100 tablet  Refills: 1            CONTINUE these medicines which have NOT CHANGED        Dose / Directions   Prenatal Vitamins 28-0.8 MG Tabs  Used for:  (spontaneous vaginal delivery)      Dose: 1 tablet  Take 1 tablet by mouth daily at 2 pm  Quantity: 100 tablet  Refills: 1            STOP taking      pyridOXINE 25 MG tablet  Commonly known as: VITAMIN B6                  Where to get your medicines        These medications were sent to [facility] [place], [place] - [address]  [address] [address]      Phone:  [phone]   acetaminophen 325 MG tablet  ferrous sulfate 325 (65 Fe) MG tablet  ibuprofen 600 MG tablet  Prenatal Vitamins 28-0.8 MG Tabs  SENNA-docusate sodium 8.6-50 MG tablet         Discharge/Disposition:  Ofe Samuels was discharged to home in stable condition with the following instructions/medications:  1) Call for temperature > 100.4, bright red vaginal bleeding >1 pad an hour x 2 hours, foul smelling vaginal discharge, pain not controlled by usual oral pain meds, persistent nausea and vomiting not controlled on medications  2) She declined contraception.  3) For feeding she decided to breast and bottle feed.  4) She was instructed to follow-up with her primary OB in 6 weeks for a routine postpartum visit and 1 week for a mood check.    Annetta Connell MD  OB/GYN PGY-2  1/24/2024 6:13 AM     Physician Attestation   I saw and evaluated this patient prior to discharge.  I discussed the patient with the resident/fellow and agree with plan of care as documented in the note.      I personally reviewed vital signs, medications, and labs.    I personally spent 5 minutes on discharge activities.    Farida Holland MD  Date of Service (when I saw the patient): 01/24/24

## 2024-01-22 NOTE — L&D DELIVERY NOTE
OB Vaginal Delivery Note    Ofe Samuels MRN# [id number]   Age: 23 year old YOB: 2000       GA: 37w1d  GP:   Labor Complications: None   EBL: 50 mL  Delivery QBL: 111 mL  Delivery Type: Vaginal, Spontaneous   ROM to Delivery Time: (Delivered) Hours: 1 Minutes: 47  [place] Weight: 2.3 kg (5 lb 1.1 oz)    1 Minute 5 Minute 10 Minute   Apgar Totals: 8   9             Delivery Details:  Ms. Ofe Samuels is a 23 year old now  at 37w1d admitted for IOL for severe fetal growth restriction and fetal ventriculomegaly. She received an epidural for pain. She received PV misoprostol for cervical ripening. Labor was augmented with Pitocin and AROM. She was GBS negative and did not require antibiotic prophylaxis. She progressed to complete and pushed for 42 minutes. She delivered a viable female infant at 10:07 AM on 24. Shoulders delivered easily. Infant with spontaneous cry. Placed on mother's abdomen. APGARs 8 and 9 at 1 and 5 minutes. Weight 2300 grams. NICU was present for delivery due to severe FGR. Placenta delivered with gentle cord traction, and noted to be intact with 3 vessel cord. She sustained no lacerations. She received IV Pitocin for uterotonics. Fundus firm and lochia minimal at the end of the case. EBL 50 mL.    Dr. Benites was present for the entire delivery.    Valeria Almaraz MD  OB/GYN PGY-2  2024 2:00 PM         Morgan Samuels-Ofe [[id number]]      Labor Event Times      Latent labor onset date/time: 2024 1036    Active labor onset date: 24 Onset time:  2:00 AM   Dilation complete date: 23 Complete time:  9:24 AM   Start pushing date/time: 2024 0925          Labor Length      2nd Stage (hrs): 2881 (min): 43   3rd Stage (hrs): 0 (min): 3          Labor Events     labor?: No   steroids: None  Labor Type: Induction/Cervical ripening  Predominate monitoring during 1st stage: continuous electronic fetal monitoring       Rupture date/time:  24 0820   Rupture type: Spontaneous Rupture of Membranes  Fluid color: Clear  Fluid odor: Normal     Induction: Misoprostol, Oxytocin  Induction date/time: 24 2250    Cervical ripening date/time: 24 1036    Indications for induction: Fetal Indications or Congenital Malformations (Comment to specify)       Delivery/Placenta Date and Time      Delivery Date: 24 Delivery Time: 10:07 AM   Placenta Date/Time: 2024 10:10 AM  Oxytocin given at the time of delivery: after delivery of baby  Delivering clinician: Elly Benites MD              Vaginal Counts       Initial count performed by 2 team members:  Two Team Members   kian Benites         Needles Suture Needles Sponges (RETIRED) Instruments   Initial counts       Added to count       Relief counts       Final counts               Placed during labor Accounted for at the end of labor   FSE No NA   IUPC No NA   Cervidil No NA                  Final count performed by 2 team members:  Two Team Members   kian Benites      Final count correct?: Yes  Pre-Birth Team Brief: Complete  Post-Birth Team Debrief: Complete       Apgars    Living status: Living   1 Minute 5 Minute 10 Minute 15 Minute 20 Minute   Skin color: 1  1       Heart rate: 2  2       Reflex irritability: 2  2       Muscle tone: 1  2       Respiratory effort: 2  2       Total: 8  9       Apgars assigned by: PRATIK JETER       Cord      Vessels: 3 Vessels    Cord Complications: None               Delayed cord clamping?: Yes    Cord Clamping Delay (seconds): 31-60 seconds    Stem cell collection?: No           Tiger Resuscitation    Methods: None   Care at Delivery: Asked by Dr. Benites to attend the delivery of this term, female infant with a gestational age of 37 1/7 weeks secondary to IUGR and concerns for ventriculomegaly.      60 seconds of delayed cord clamping were completed.  The infant was stimulated, cried and had spontaneous  "respirations during delayed cord clamping.  The infant was placed on a warmer, dried and stimulated.   Gross PE is WNL except for caput.  Infant required no further resuscitation.  Infant was shown to mother and father, handoff to nursery nurse and will be transferred to the [facility] for further care.    Mayela Yung JEANNE LYNN 2024 10:39 AM                 Measurements      Weight: 5 lb 1.1 oz Length: 1' 6\"     Head circumference: 12.5 cm           Skin to Skin and Feeding Plan      Skin to skin initiation date/time: 1841    Skin to skin with: Mother  Skin to skin end date/time:     Breastfeeding initiated date/time: 2024 1045       Labor Events and Shoulder Dystocia    Fetal Tracing Prior to Delivery: Category 2  Shoulder dystocia present?: Neg       Delivery (Maternal) (Provider to Complete) ([id number])    Episiotomy: None  Perineal lacerations: None    Repair suture: None       Blood Loss  Mother: Ofe Samuels #[id number]     Start of Mother's Information      Delivery Blood Loss  24 0200 - 24 1359      Delivery QBL (mL) Hospital Encounter 161 mL    Total  161 mL               End of Mother's Information  Mother: Ofe Samuels #[id number]                Delivery - Provider to Complete ([id number])    Delivering clinician: Elly Benites MD  Delivery Type (Choose the 1 that will go to the Birth History): Vaginal, Spontaneous                                           Placenta    Date/Time: 2024 10:10 AM  Removal: Spontaneous             Anesthesia    Method: INTRAVENOUS , Epidural                    Presentation and Position    Presentation: Vertex                    Yara Almaraz MD    "

## 2024-01-22 NOTE — PROGRESS NOTES
[facility]  Labor Progress Note    Subjective:  Per RN, patient continues to get more uncomfortable since starting pit; still not quite ready for epidural.    Objective:   Patient Vitals for the past 4 hrs:   BP Temp Temp src Resp   24 0229 102/59 98.3  F (36.8  C) Oral 16     SVE: 3-4/60/-2 () per G3, chaperoned by RN and staff -- not repeated at this time pending epidural   Membranes: intact    FHT: Baseline 120, moderate variability, accelerations present, no decelerations  [place]: Up to 5 contractions in 10 minutes        Assessment/Plan:  Ofe Samuels is a 23 year old  at 37w1d, here for IOL in s/o sFGR and ventriculomegaly.    Labor:  - s/p PV misoprostol  - Pitocin and AROM PRN; discussed starting pitocin d/t lack of cervical changes (low-dose protocol ordered given ctx frequency per RN; may increase rate per usual protocol if ctx pattern allows)   - On pit since , running at 4, max of 4   - Will AROM after epidural  - SVE: 3/50/-2 (0940, 1245)> miso x2> 3-4/50/-2 (1715)> 3-4/60/-2 ()> pit ()  - Pain management: Plans epidural for analgesia  - FWB: Cat 1, reactive and reassuring. Continue EFM and toco.    Alejandrina Montalvo, MS3    I was present with the student who participated in the service and in the documentation of the note. I have verified the history and personally performed the physical exam and medical decision-making. I agree with the assessment and plan of care as documented in the note.    Shruthi Casanova MD, PGY-3  5:35 AM  [facility] Obstetrics & Gynecology Residency

## 2024-01-22 NOTE — PROGRESS NOTES
[facility]  Labor Progress Note    Subjective:  Patient feeling more contractions. Still not very painful.     Objective:   Patient Vitals for the past 4 hrs:   BP Temp Temp src Resp   24 1818 96/55 98.3  F (36.8  C) Oral 16   24 1520 108/57 98.4  F (36.9  C) Oral 16     SVE: 3-4/50/-2  Membranes: intact    FHT: Baseline 140, moderate variability, accelerations present, no decelerations  Centre: 3 contractions in 10 minutes    Assessment/Plan:  Ofe Samuels is a 23 year old  at 37w0d, here for IOL in s/o sFGR and ventriculomegaly.    Labor:  - s/p PV misoprostol. Will plan to start pitocin for augmentation  - Pain management: Desires epidural for analgesia  - FWB: Cat 1, reactive and reassuring. Continue EFM and toco.    Maria Luisa Levin MD  Obstetrics & Gynecology, PGY-1  2024 5:20PM      Present for assessment. I checked the cervix as noted above.   Fetal status is reassuring with reactive NST and a negative CST.   Contractions q 2-3 min.  Will begin pitocin augmentation if ctx's space out.   Esther Pike MD

## 2024-01-22 NOTE — PLAN OF CARE
Goal Outcome Evaluation: VSS. Postpartum checks WDL. Up independently, voiding without difficulty. Pain managed with ibuprofen. Attempting to breastfeed, baby not latching. Demonstrated hand-expression and supplementing with formula via bottle.

## 2024-01-22 NOTE — ANESTHESIA PROCEDURE NOTES
Combined intrathecal/epidural Procedure Note    Pre-Procedure   Staff -        Anesthesiologist:  Yuri Lazaro MD       Resident/Fellow: Speedy Galan MD       Performed By: resident and with residents       Procedure performed by resident/fellow/CRNA in presence of a teaching physician.         Location: OB       Procedure Start/Stop Times: 1/22/2024 8:31 AM and 1/22/2024 8:40 AM       Pre-Anesthestic Checklist: patient identified, IV checked, risks and benefits discussed, informed consent, monitors and equipment checked, pre-op evaluation, at physician/surgeon's request and post-op pain management  Timeout:       Correct Patient: Yes        Correct Procedure: Yes        Correct Site: Yes        Correct Position: Yes   Procedure Documentation  Procedure: combined intrathecal/epidural       Patient Position: sitting       Skin prep: Chloraprep       Local skin infiltrated with 3 mL of 1% lidocaine.        Insertion Site: L3-4. (midline approach).       Technique: LORT saline        KALI at 4.5 cm.       Needle Gauge: 17.        Needle Length (Inches): 5        Spinal Needle Type: Pencan       Spinal Needle (gauge): 25        Spinal Needle Length (inches): 5       Catheter: 19 G.          Catheter threaded easily.         4.5 cm epidural space.         Threaded 9 cm at skin.         # of attempts: 1 and  # of redirects:  0    Assessment/Narrative         Paresthesias: No.       Test dose of 3 mL lidocaine 1.5% w/ 1:200,000 epinephrine at 08:37 CST.         Test dose negative, 3 minutes after injection, for signs of intravascular, subdural, or intrathecal injection.       Insertion/Infusion Method: LORT saline       Aspiration negative for Heme or CSF via Epidural Catheter.       CSF fluid: with Spinal needle.CSF fluid removed: with Epidural needle - not with Epidural needle.    Medication(s) Administered   0.25% Bupivacaine PF (Intrathecal) - Intrathecal   0.8 mL - 1/22/2024 8:31:00 AM  Fentanyl PF (Intrathecal)  "- Intrathecal   10 mcg - 1/22/2024 8:31:00 AM  Medication Administration Time: 1/22/2024 8:31 AM      FOR [facility] ONLY:   Pain Team Contact information: please page the Pain Team Via [email or web address]. Search \"Pain\". During daytime hours, please page the attending first. At night please page the resident first.      "

## 2024-01-22 NOTE — PROGRESS NOTES
"[facility]  Labor Progress Note    Subjective:  Per RN, patient is getting a little more uncomfortable since starting pit; not quite ready for epidural.    Objective:   Patient Vitals for the past 4 hrs:   BP Temp Temp src Resp Height Weight   24 0036 105/55 98.5  F (36.9  C) Oral 16 -- --   24 2237 97/54 98.6  F (37  C) Oral 16 1.575 m (5' 2\") 54.9 kg (121 lb)     SVE: 3-4/60/-2 () per G3, chaperoned by RN and staff -- not repeated given recent initiation of pitocin   Membranes: intact    FHT: Baseline 135, moderate variability, accelerations present, no decelerations  Jet: Up to 5 contractions in 10 minutes        Assessment/Plan:  Ofe Samuels is a 23 year old  at 37w1d, here for IOL in s/o sFGR and ventriculomegaly.    Labor:  - s/p PV misoprostol  - Pitocin and AROM PRN; discussed starting pitocin d/t lack of cervical changes (low-dose protocol ordered given ctx frequency per RN; may increase rate per usual protocol if ctx pattern allows)   - On pit since , running at 4, max of 4   - Will AROM after epidural  - SVE: 3/50/-2 (0940, 1245)> miso x2> 3-4/50/-2 (1715)> 3-4/60/-2 ()> pit ()  - Pain management: Plans epidural for analgesia  - FWB: Cat 1, reactive and reassuring. Continue EFM and toco.    Alejandrina Montalvo, MS3    I was present with the student who participated in the service and in the documentation of the note. I have verified the history and personally performed the physical exam and medical decision-making. I agree with the assessment and plan of care as documented in the note.    Shruthi Casanova MD, PGY-3  2:32 AM  [facility] Obstetrics & Gynecology Residency      "

## 2024-01-22 NOTE — PLAN OF CARE
Dr. Pike ordered Pitocin started for augmentation. Pitocin started per orders. Discussed starting pitocin with patient, patient given time to ask questions, patient agreed with plan. Will closely monitor uterine contractions and FHT's. Plan to increase Pitocin as indicated per protocol. Anticipate . Notify provider of progressing labor, needs for pain medication, or maternal/fetal distress.

## 2024-01-22 NOTE — PLAN OF CARE
Pt admitted for IOL for FGR and ventricularmegaly. RN assumed care at 1915. VSS, EFM charted (see flowsheet), pt afebrile. Pt denies headache, vision changes, RUQ pain, bleeding. Pt plans for epidural for pain control. Anticipate . Pt stated she has no questions or concerns with plan of care at this time. Call light within reach. Report given to Mignon CREPSO RN.

## 2024-01-22 NOTE — PROGRESS NOTES
"   [facility] LABOR [facility] DELIVERY PROGRESS NOTE:   2024 8:29 AM         SUBJECTIVE:   Patient reports she's very uncomfortable and ready for epidural.           OBJECTIVE:     Vitals:    24 2237 24 0036 24 0229 24 0533   BP: 97/54 105/55 102/59 103/50   BP Location: Right arm Left arm Left arm Right arm   Patient Position: Left side Right side Right side Left side   Cuff Size: Adult Small Adult Small Adult Small Adult Small   Resp: 16 16 16 16   Temp: 98.6  F (37  C) 98.5  F (36.9  C) 98.3  F (36.8  C) 98.2  F (36.8  C)   TempSrc: Oral Oral Oral Oral   Weight: 54.9 kg (121 lb)      Height: 1.575 m (5' 2\")            NST:  reactive  FHT:  125/mod/+ accels. Did have shallow decel lasting 1-2 min around 0810 this morning  Cat:   2  [place]:  q 23 min  SVE:  /-2, intact             ASSESSMENT / PLAN:   23 year old  at 37w1d admitted for IOL for sFHR and ventriculomegaly.      Labor: s/p miso and now on pitocin.  Pitocin has been titrated overnight and patient declining epidural until this morning, so SVE deferred until just recently.  Making good progress  Fetal well being: Category 2 tracing.  GBS: neg  Pain: anesthesia notified of patient's desire for epidural now.    Consider AROM after comfortable with epidural if she hasn't ruptured membranes before then.    Elly Benites MD        "

## 2024-01-23 LAB — HGB BLD-MCNC: 8.4 G/DL (ref 11.7–15.7)

## 2024-01-23 PROCEDURE — 258N000003 HC RX IP 258 OP 636: Performed by: OBSTETRICS & GYNECOLOGY

## 2024-01-23 PROCEDURE — 250N000011 HC RX IP 250 OP 636: Performed by: OBSTETRICS & GYNECOLOGY

## 2024-01-23 PROCEDURE — 36415 COLL VENOUS BLD VENIPUNCTURE: CPT

## 2024-01-23 PROCEDURE — 120N000002 HC R&B MED SURG/OB UMMC

## 2024-01-23 PROCEDURE — 85018 HEMOGLOBIN: CPT

## 2024-01-23 PROCEDURE — 250N000013 HC RX MED GY IP 250 OP 250 PS 637

## 2024-01-23 RX ORDER — NALOXONE HYDROCHLORIDE 1 MG/ML
0.4 INJECTION INTRAMUSCULAR; INTRAVENOUS; SUBCUTANEOUS
Status: DISCONTINUED | OUTPATIENT
Start: 2024-01-23 | End: 2024-01-24 | Stop reason: HOSPADM

## 2024-01-23 RX ORDER — ACETAMINOPHEN 325 MG/1
650 TABLET ORAL EVERY 6 HOURS PRN
Qty: 100 TABLET | Refills: 0 | Status: SHIPPED | OUTPATIENT
Start: 2024-01-23

## 2024-01-23 RX ORDER — IBUPROFEN 600 MG/1
600 TABLET, FILM COATED ORAL EVERY 6 HOURS PRN
Qty: 60 TABLET | Refills: 0 | Status: SHIPPED | OUTPATIENT
Start: 2024-01-23

## 2024-01-23 RX ORDER — NALOXONE HYDROCHLORIDE 1 MG/ML
0.2 INJECTION INTRAMUSCULAR; INTRAVENOUS; SUBCUTANEOUS
Status: DISCONTINUED | OUTPATIENT
Start: 2024-01-23 | End: 2024-01-24 | Stop reason: HOSPADM

## 2024-01-23 RX ORDER — DIPHENHYDRAMINE HYDROCHLORIDE 50 MG/ML
50 INJECTION INTRAMUSCULAR; INTRAVENOUS
Status: DISCONTINUED | OUTPATIENT
Start: 2024-01-23 | End: 2024-01-24 | Stop reason: HOSPADM

## 2024-01-23 RX ORDER — METHYLPREDNISOLONE SODIUM SUCCINATE 125 MG/2ML
125 INJECTION, POWDER, LYOPHILIZED, FOR SOLUTION INTRAMUSCULAR; INTRAVENOUS
Status: DISCONTINUED | OUTPATIENT
Start: 2024-01-23 | End: 2024-01-24 | Stop reason: HOSPADM

## 2024-01-23 RX ADMIN — IRON SUCROSE 300 MG: 20 INJECTION, SOLUTION INTRAVENOUS at 13:30

## 2024-01-23 RX ADMIN — IBUPROFEN 800 MG: 800 TABLET, FILM COATED ORAL at 20:22

## 2024-01-23 RX ADMIN — IBUPROFEN 800 MG: 800 TABLET, FILM COATED ORAL at 05:48

## 2024-01-23 RX ADMIN — DOCUSATE SODIUM 100 MG: 100 CAPSULE, LIQUID FILLED ORAL at 08:51

## 2024-01-23 RX ADMIN — IBUPROFEN 800 MG: 800 TABLET, FILM COATED ORAL at 13:30

## 2024-01-23 ASSESSMENT — ACTIVITIES OF DAILY LIVING (ADL)
ADLS_ACUITY_SCORE: 18

## 2024-01-23 NOTE — PLAN OF CARE
Pt doing well this evening, VSS, voiding and ambulating without difficulty. Fundus firm, bleeding scant. Pt declined breastfeeding attempt this evening but wanting to pump instead, Nicole PATRICK, IBCLC plans to help pt with pumping this evening. Pt and significant other escorted infant down to U/S today for head U/S. No further concerns at this time, will continue with plan of care.

## 2024-01-23 NOTE — PLAN OF CARE
Goal Outcome Evaluation:  Stable patient and moving well in the room. Taking motrin for  her back pain and helping.  Iron infusion given and tolerated  well. Breastfeeding and  pumping  at times. Will continue with plan  of care.

## 2024-01-23 NOTE — LACTATION NOTE
This note was copied from a baby's chart.  Consult for: Early Term Infant    Infant Name: Bebo    Infant's Primary Care Clinic: Undecided but likely [facility]    Maternal Information: Ofe shares she is feeling well. She was able to get some sleep overnight. She has pumped x 1 and got some drops of colostrum.     Infant information: Bebo has age appropriate output and weight loss.  She was SGA at birth. Her weight loss at 24 hours of age was -1.7% of her birth weight at 4lb 15.8 oz.     Weight Change Since Birth: -2% at 1 day old     Feeding History: Parents share they have been attempting to breastfeed and are then supplementing with formula. eBbo was mostly bottle fed formula overnight so parents could maximize sleep.     Feeding Assessment: Bebo was skin to skin with Ofe when I arrived in the room. She was undressed and very sleepy despite Ofe's attempt to wake her. Ofe was unable to achieve a latch. Bedside RN weighed Bebo but she remained sleepy after this.  Ofe again positioned Bebo skin to skin in the cross cradle position and attempted to latch. Bebo opened her mouth x 1 and briefly latched but did not suck or sustain latch.     As she was asleep and we were not able to wake her enough to latch, her father, Wilbert, was encouraged to supplement while Ofe pumped. Ofe was assisted with pump set up and operation.      Education:   [x] Potential feeding challenges of early term infants  [x] Expected  feeding patterns in the first few days (pg. 38 of Your Guide to To Postpartum and  Care)/ the Second Night  [x] Stages of milk production  [x] Benefits of hand expression of colostrum  [] Early feeding cues   [x] Feeding frequency- encourage at least every 3 hours.     [] Benefits of feeding on cue  [x] Benefits of skin to skin  [x] Breastfeeding positions  [] Tips to get and maintain a deep latch  [] Nutritive vs.non-nutritive sucking  [] Gentle breast compressions as needed to enhance  "milk transfer  [] How to tell when baby is finished  [x] How to tell if baby is getting enough  [x] Expected  output  [x]  weight loss  [] Infant Feeding Log  [x] [employer] [facility] Breastfeeding/Pumping videos  [] Signs breastfeeding is going well (comfortable latch, audible swallows, age appropriate output and weight loss)    [] Tips to prevent engorgement  [] Signs of engorgement  [] Tips to manage engorgement  [x] Hand expression and pumping recommendations  [x] Supplement recommendations   [] Satiety cues   [x] [facility] breast pump part/infant feeding supplies cleaning recommendations  [x] Inpatient breastfeeding support  [x] Outpatient lactation resources and follow up recommendations    Handouts: [facility] Lactation Resources    Home Breast Pump: Does not have pump, will likely choose a Pump in Style at discharge     Plan (Early Term): Frequent skin to skin, watch for early feeding cues and breastfeed on cue with goal of 8 to 12 feedings in 24 hours. Go no longer than 3 hours between feedings. Encourage breast compressions to enhance milk transfer when infant at the breast.    Encourage hand expression after feedings until milk is in, and hands on pumping 4-6 times daily to support \"full term\" supply. Supplement after breastfeeding with any expressed milk.     Continue supplementing with formula per Peds recommendations.     Follow up with outpatient lactation consultant within a week of discharge for support with early term infant, and  weaning from pumping after supply established. Family will potentially follow up with [facility] but they were also given the [facility] Lactation Resource Handout.      Gabby Joseph RN, IBCLC   Lactation Consultant  [place]: [facility] [phone]  Office: [phone]  Ascom: [phone]        "

## 2024-01-23 NOTE — LACTATION NOTE
This note was copied from a baby's chart.  Consult for:  support with breastfeeding / pumping     Infant Name:     Infant's Primary Care Clinic:     Delivery Information:  infant was born at Gestational Age: 37w1d via vaginal delivery on 1/22/2024 10:07 AM     Maternal Health History:  Maternal past medical history, problem list and prior to admission medications reviewed and unremarkable., Maternal past medical history, problem list and prior to admission medications reviewed. Limited / late prenatal care coming into system at 34 weeks gestation (appears to have come form [place]? With little to no prenatal care)  Information for the patient's mother:  Ofe Samuels [[id number]]     Past Medical History:   Diagnosis Date    Known health problems: none     ,   Information for the patient's mother:  Ofe Samuels [[id number]]     Patient Active Problem List   Diagnosis    Supervision of high risk pregnancy in third trimester    Iron deficiency anemia of mother during pregnancy    Pregnancy complicated by fetal cerebral ventriculomegaly    Pregnancy affected by fetal growth restriction    Pregnancy    ,   Information for the patient's mother:  Ofe Samuels [[id number]]     Medications Prior to Admission   Medication Sig Dispense Refill Last Dose    ferrous sulfate (FEROSUL) 325 (65 Fe) MG tablet Take 1 tablet by mouth 2 times daily (Patient not taking: Reported on 1/12/2024)       Prenatal Vit-Fe Fumarate-FA (PRENATAL VITAMINS) 28-0.8 MG TABS Take 1 tablet by mouth daily at 2 pm (Patient not taking: Reported on 1/12/2024)       VITAMIN  B-6 25 MG tablet Take 1 tablet by mouth 3 times daily (Patient not taking: Reported on 1/12/2024)       , Maternal complications: late prenatal care and intrauterine growth retardation, complicated pregnancy due to fetal cerebral ventriculomegaly      Maternal Breast Exam:  Ofe noted breast growth and sensitivity in early pregnancy. She denies any history of breast/chest injury or surgery. Her  "breasts are soft and symmetrical with bilateral intact, dimpled at tip nipples.    Breastfeeding/ Lactation History: First infant - some latching but Ofe feels there is \"no milk\" at breast for infant    Infant information: infant was SGA at birth and has age appropriate output and weight loss.  Infant is 37w1d and 2.3 kg at birth    Weight Change Since Birth: 0% at 0 day old     Oral exam of baby:  not assessed at this visit    Feeding History: a few suckles per infant's mother, Ofe    Feeding Assessment:  not observed during this visit, infant has been mostly taking bottles of formula, set Ofe up with her hospital grade pump. Started pumping in the initiate mode    Education:   [] Expected  feeding patterns in the first few days (pg. 38 of Your Guide to To Postpartum and [place] Care)/ the Second Night  [x] Stages of milk production  [] Benefits of hand expression of colostrum  [] Early feeding cues     [] Benefits of feeding on cue  [] Benefits of skin to skin  [] Breastfeeding positions  [] Tips to get and maintain a deep latch  [] Nutritive vs.non-nutritive sucking  [] Gentle breast compressions as needed to enhance milk transfer  [] How to tell when baby is finished  [] How to tell if baby is getting enough  [] Expected  output  [] [place] weight loss  [] Infant Feeding Log  [] [employer] Breastfeeding/Pumping videos  [] Signs breastfeeding is going well (comfortable latch, audible swallows, age appropriate output and weight loss)    [] Tips to prevent engorgement  [] Signs of engorgement  [] Tips to manage engorgement  [x] Pumping recommendations (based on patient need)  [] [facility] breast pump part/infant feeding supplies cleaning recommendations  [x] Inpatient breastfeeding support  [] Outpatient lactation resources      Home Breast Pump: doesn't have a pump, we discussed pumps and Ofe prefers the [employer] pump-N-Style at discharge    Plan: Continue breastfeeding on cue with RN support as " needed with a goal of 8-12 feedings per day.     Encourage frequent skin to skin and hand expression.     Supplement after each attempt at breastfeeding    Start pumping with each supplement / bottle given      Nicole Benites RN, IBCLC   Lactation Consultant  [place]: [facility] [phone]  Office: [phone]  Ascom: [phone]

## 2024-01-23 NOTE — PROGRESS NOTES
"  Anesthesia Post-Partum Follow-Up Note After Vaginal Delivery with Epidural    Patient: Ofe Samuels    Patient location: Post-partum floor    Anesthesia type: Epidural    Subjective  Ofe Samuels does not complain of pruritis at this time. She denies weakness, denies paresthesia, denies difficulties breathing or voiding, denies nausea or vomiting, and denies headache. She is able to ambulate and tolerates regular diet. Patient endorsed an overall positive anesthesia experience.    Objective  Respiratory Function (RR / SpO2 / Airway Patency): Satisfactory  Cardiac Function (HR / Rhythm / BP): Satisfactory  Strength and sensation lower extremities: Normal  Site of epidural insertion: No signs of infection or inflammation    Most recent vitals  /82 (BP Location: Left arm, Patient Position: Right side, Cuff Size: Adult Small)   Pulse 82   Temp 36.7  C (98  F) (Oral)   Resp 16   Ht 1.575 m (5' 2\")   Wt 54.9 kg (121 lb)   LMP 2023   SpO2 99%   Breastfeeding Unknown   BMI 22.13 kg/m      Assessment and plan  Ofe Samuels is a 23 year old female  post-partum #1 s/p vaginal delivery. An epidural catheter was successfully inserted  and provided labor analgesia via programmed intermittent bolus pump infusing 0.1% ropivacaine and 2 mcg/ml fentanyl, in addition to patient-controlled epidural analgesia and physician hand boluses as needed. The patient delivered via  and the epidural catheter was removed immediately thereafter by the L&D RN.     At this time, there is no evidence of adverse side effects associated with the insertion or removal of the epidural catheter. If the patient develops new lower extremity paresis or paresthesias, or if there are concerns regarding the insertion site of the catheter, please reach out to the anesthesia department OB division [phone]    Thank you for including us in the care for this patient. Anesthesia is signing off at this time.     Speedy Galan, " MD  Anesthesiology Resident, [place]-2

## 2024-01-23 NOTE — PLAN OF CARE
Data: Vital signs within normal limits. Postpartum checks within normal limits - see flow record. Patient eating and drinking normally. Patient able to empty bladder independently and is up ambulating. Patient performing self cares and is able to care for infant.    Action: Patient medicated during the shift for pain. See MAR. Patient reassessed  after each medication and patient stated she was comfortable. Patient education done about the need to feed infant every three hours. See flow record.    Response: Positive attachment behaviors observed with infant. Significant other Wilbert present.     Plan: Continue to follow care plan

## 2024-01-23 NOTE — PROGRESS NOTES
"Obstetrics Postpartum Progress Note  DOS: 2024  MRN: [id number]  PPD#1 after      S: Patient states she is doing well.  Pain is well controlled with current pain regimen. Lochia similar to menses.  Eating and drinking; had some nausea last night but no vomiting, now improved. Appetite remains somewhat minimal.  Ambulating without difficulty.  Voiding on her own spontaneously.   Breast and bottle feeding. Does not want anything for birth control, wants time for her body to \"recover.\" Endorses some back pain that is new this morning, just received ibuprofen. Not ready to go home today, would like another day to work on breastfeeding, pain control.   O:  Vitals:    24 1312 24 1615 24 2019 24 0021   BP: 119/73 120/71 111/63 120/68   BP Location: Left arm Left arm Left arm Left arm   Patient Position: Semi-Rodas's Semi-Rodas's Sitting Sitting   Cuff Size: Adult Regular Adult Regular Adult Regular Adult Regular   Pulse: 61 78 71 63   Resp: 16 16 16 16   Temp: 98.2  F (36.8  C) 97.7  F (36.5  C) 98.8  F (37.1  C) 98.6  F (37  C)   TempSrc: Oral Oral Oral Oral   SpO2: 99%      Weight:       Height:           Gen:  NAD  CV: regular rate, well-perfused  Resp: nonlabored breathing on room air, normal inspiratory effort  Abd: soft, nondistended, nontender, fundus firm and below umbilicus  Ext: non-tender, no edema, no erythema    Weight:    Vitals:    24 2237   Weight: 54.9 kg (121 lb)         Hemoglobin   Date Value Ref Range Status   2024 8.6 (L) 11.7 - 15.7 g/dL Final   2024 9.1 (L) 11.7 - 15.7 g/dL Final   2024 9.1 (L) 11.7 - 15.7 g/dL Final       Rubella Antibody IgG   Date Value Ref Range Status   2024 Positive  Final     Comment:     Suggests previous exposure or immunization and probable immunity.       Assessment and Plan: 23 year old  PPD#1 s/p , doing well postpartum. Pregnancy was complicated by anemia, severe fetal growth restriction and " scant PNC.     Postpartum:  Heme: Hgb 8.6 > EBL 50 > AM hgb pending. No signs or symptoms of ABLA. Will discharge home with PO iron.  Pain control: pain is well-controlled with tylenol and ibuprofen, continue  Rh pos/Rubella immuneimmune  Routine cares:  Feed:  breast + formula   Encourage ambulation  Continue regular diet  Continue sched bowel regimen, prn antiemetics  Contraception: declines. Discussed adequate pregnancy spacing of 18 months, and ability to conceive with next cycle      Dispo: Anticipate discharge home PPD#2    Annetta Connell MD  OB/GYN PGY-2  1/23/2024 6:43 AM     Patient seen and examined by me, agree with above resident note. Overall doing well and meeting early goals.  Anxious to learn about results of baby's MRI and ongoing status.  Thankful she seems to be doing well now.  Discussed breastfeeding and will plan to work on that today.  Cont routine cares, likely discharge in am    ASHISH FARR MD

## 2024-01-24 ENCOUNTER — DOCUMENTATION ONLY (OUTPATIENT)
Dept: OBGYN | Facility: CLINIC | Age: 24
End: 2024-01-24

## 2024-01-24 VITALS
BODY MASS INDEX: 20.8 KG/M2 | WEIGHT: 113.02 LBS | OXYGEN SATURATION: 100 % | SYSTOLIC BLOOD PRESSURE: 108 MMHG | HEIGHT: 62 IN | DIASTOLIC BLOOD PRESSURE: 74 MMHG | HEART RATE: 66 BPM | RESPIRATION RATE: 16 BRPM | TEMPERATURE: 98.3 F

## 2024-01-24 PROCEDURE — 250N000013 HC RX MED GY IP 250 OP 250 PS 637

## 2024-01-24 RX ORDER — FERROUS SULFATE 325(65) MG
325 TABLET ORAL
Qty: 100 TABLET | Refills: 1 | Status: SHIPPED | OUTPATIENT
Start: 2024-01-24

## 2024-01-24 RX ORDER — SENNA AND DOCUSATE SODIUM 50; 8.6 MG/1; MG/1
1 TABLET, FILM COATED ORAL AT BEDTIME
Qty: 100 TABLET | Refills: 0 | Status: SHIPPED | OUTPATIENT
Start: 2024-01-24

## 2024-01-24 RX ORDER — PNV NO.95/FERROUS FUM/FOLIC AC 28MG-0.8MG
1 TABLET ORAL
Qty: 100 TABLET | Refills: 1 | Status: SHIPPED | OUTPATIENT
Start: 2024-01-24

## 2024-01-24 RX ORDER — ACETAMINOPHEN 325 MG/1
650 TABLET ORAL EVERY 4 HOURS PRN
Status: DISCONTINUED | OUTPATIENT
Start: 2024-01-24 | End: 2024-01-24 | Stop reason: HOSPADM

## 2024-01-24 RX ADMIN — DOCUSATE SODIUM 100 MG: 100 CAPSULE, LIQUID FILLED ORAL at 13:57

## 2024-01-24 RX ADMIN — IBUPROFEN 800 MG: 800 TABLET, FILM COATED ORAL at 13:09

## 2024-01-24 RX ADMIN — IBUPROFEN 800 MG: 800 TABLET, FILM COATED ORAL at 04:42

## 2024-01-24 ASSESSMENT — ACTIVITIES OF DAILY LIVING (ADL)
ADLS_ACUITY_SCORE: 18

## 2024-01-24 NOTE — LACTATION NOTE
This note was copied from a baby's chart.  Follow Up Consult: lactation support on likely day of discharge     Infant Name: eBbo    Infant's Primary Care Clinic: [facility]     Maternal Assessment: Mom has been working on breastfeeding and also  pumping occasionally, reports seeing drops when pumping.     Infant Assessment:  Bebo has age appropriate output and weight loss.      Weight Change Since Birth: -2% at 2 day old      Feeding History: Breastfeeding with formula supplementation due to SGA protocol       Education:   [x] Expected  feeding patterns in the first few days (pg. 38 of Your Guide to To Postpartum and [place] Care)/ the Second Night  [x] Stages of milk production  [x] Benefits of hand expression of colostrum  [x] Early feeding cues     [x] Benefits of feeding on cue  [x] Benefits of skin to skin  [x] How to tell when baby is finished  [x] How to tell if baby is getting enough  [x] Expected  output  [x] [place] weight loss  [x] Infant Feeding Log  [x] Signs breastfeeding is going well (comfortable latch, audible swallows, age appropriate output and weight loss)    [x] Tips to prevent engorgement  [x] Signs of engorgement  [x] Tips to manage engorgement  [x] Pumping recommendations (based on patient need)  [x] [facility] breast pump part/infant feeding supplies cleaning recommendations  [x] Inpatient breastfeeding support  [x] Outpatient lactation resources    Handouts: Infant Feeding Log (Week 1, Your Guide to Postpartum &  Care Book) and [facility] Lactation Resources    Home Breast Pump: Dispensed Medela Pump N Style, set up, use and care reviewed      Plan (Early Term): Frequent skin to skin, watch for early feeding cues and breastfeed on cue with goal of 8 to 12 feedings in 24 hours. Go no longer than 3 hours between feedings. Encourage breast compressions to enhance milk transfer when infant at the breast.    Encourage hand expression after feedings until  "milk is in, and hands on pumping 4-6 times daily to support \"full term\" supply. Supplement after breastfeeding with any expressed milk.     Follow up with outpatient lactation consultant within a week of discharge for support with early term infant, and  weaning from pumping after supply established. [facility] plans [facility] with [facility], outpatient lactation resources reviewed.        Nazia Christianson RN, IBCLC   Lactation Consultant  [place]: [facility] [phone]  Office: [phone]  Ascom: [phone]         "

## 2024-01-24 NOTE — PROGRESS NOTES
Obstetrics Postpartum Progress Note  DOS: 2024  MRN: [id number]  PPD#2 after      S: patient is doing well this morning. Has some pain with position changes, but overall tolerating it well. Denies lightheadedness or dizziness with ambulation. Vaginal bleeding has been similar to a period. Tolerating PO without nausea or vomiting. Not sure if she has passed gas yet. Working on breastfeeding, feels her breasts have gotten more engorged very quickly overnight. Feels she will be ready for discharge home today.   O:  Patient Vitals for the past 24 hrs:   BP Temp Temp src Pulse Resp SpO2   24 0728 108/74 98.3  F (36.8  C) Oral 66 16 --   24 2305 119/62 98.5  F (36.9  C) Oral 67 16 --   24 1507 118/70 98.6  F (37  C) Oral 86 16 100 %   24 1445 112/68 98.4  F (36.9  C) Oral 78 16 99 %   24 1420 119/80 98  F (36.7  C) Oral 88 16 100 %   24 1404 112/81 98  F (36.7  C) Oral 82 16 99 %   24 1335 133/82 98  F (36.7  C) Oral 82 16 99 %   ]  Gen:  NAD, ambulating in lacey and in room   CV: regular rate, well-perfused  Resp: nonlabored breathing on room air, normal inspiratory effort  Abd: soft, mildly distended, nontender, fundus firm at the umbilicus  Ext: non-tender, no edema     Hemoglobin   Date Value Ref Range Status   2024 8.4 (L) 11.7 - 15.7 g/dL Final   2024 8.6 (L) 11.7 - 15.7 g/dL Final       Rubella Antibody IgG   Date Value Ref Range Status   2024 Positive  Final     Comment:     Suggests previous exposure or immunization and probable immunity.       Assessment and Plan: 23 year old  PPD#2 s/p , doing well postpartum. Pregnancy was complicated by anemia, severe fetal growth restriction and scant PNC. Feels ready for discharge today.     Postpartum:  Heme: Hgb 8.6 > EBL 50 > 8.4> IV Fe. No signs or symptoms of acute blood loss on chronic anemia. Will discharge home with PO iron.  Pain control: pain is well-controlled with tylenol and  ibuprofen, continue  Rh pos/Rubella immuneimmune  Routine cares:  Feed:  breast + formula   Encourage ambulation  Continue regular diet  Continue sched bowel regimen, prn antiemetics  Contraception: declines. Previously discussed adequate pregnancy spacing of 18 months, and ability to conceive with next cycle      Dispo: Anticipate discharge home today    nAnetta Connell MD  OB/GYN PGY-2  1/24/2024 6:10 AM     Physician Attestation   I personally examined and evaluated this patient.  I discussed the patient with the resident/fellow and care team, and agree with the assessment and plan of care as documented in the note.     Key findings: Patient states her pain is well controlled, voiding without issues, tolerating regular diet without nausea or vomiting, ambulating without dizziness.  Discussed normal vs. Abnormal postpartum vaginal bleeding.  Reviewed recommended follow up and return precautions. Message sent to clinic for 6 week postpartum visit and 1 week mood check. Plan discharge to home today.    Farida Holland MD  Date of Service (when I saw the patient): 01/24/24

## 2024-01-24 NOTE — DISCHARGE INSTRUCTIONS
Warning Signs after Having a Baby    Keep this paper on your fridge or somewhere else where you can see it.    Call your provider if you have any of these symptoms up to 12 weeks after having your baby.    Thoughts of hurting yourself or your baby  Pain in your chest or trouble breathing  Severe headache not helped by pain medicine  Eyesight concerns (blurry vision, seeing spots or flashes of light, other changes to eyesight)  Fainting, shaking or other signs of a seizure    Call 9-1-1 if you feel that it is an emergency.     The symptoms below can happen to anyone after giving birth. They can be very serious. Call your provider if you have any of these warning signs.    My provider s phone number: _______________________    Losing too much blood (hemorrhage)    Call your provider if you soak through a pad in less than an hour or pass blood clots bigger than a golf ball. These may be signs that you are bleeding too much.    Blood clots in the legs or lungs    After you give birth, your body naturally clots its blood to help prevent blood loss. Sometimes this increased clotting can happen in other areas of the body, like the legs or lungs. This can block your blood flow and be very dangerous.     Call your provider if you:  Have a red, swollen spot on the back of your leg that is warm or painful when you touch it.   Are coughing up blood.     Infection    Call your provider if you have any of these symptoms:  Fever of 100.4 F (38 C) or higher.  Pain or redness around your stitches if you had an incision.   Any yellow, white, or green fluid coming from places where you had stitches or surgery.    Mood Problems (postpartum depression)    Many people feel sad or have mood changes after having a baby. But for some people, these mood swings are worse.     Call your provider right away if you feel so anxious or nervous that you can't care for yourself or your baby.    Preeclampsia (high blood pressure)    Even if you  "didn't have high blood pressure when you were pregnant, you are at risk for the high blood pressure disease called preeclampsia. This risk can last up to 12 weeks after giving birth.     Call your provider if you have:   Pain on your right side under your rib cage  Sudden swelling in the hands and face    Remember: You know your body. If something doesn't feel right, get medical help.     For informational purposes only. Not to replace the advice of your health care provider. Copyright 2020 [employer] [facility]. All rights reserved. Clinically reviewed by Lorraine Pham, RNC-OB, MSN. [employer] 108071 - Rev .    Postpartum: Care Instructions  Overview  After childbirth (postpartum period), your body goes through many changes. Some of these changes happen over several weeks. In the hours after delivery, your body will begin to recover from childbirth while it prepares to breastfeed your . You may feel emotional during this time. Your hormones can shift your mood without warning for no clear reason.  In the first couple of weeks after childbirth, it's common to have emotions that change from happy to sad. You may find it hard to sleep. You may cry a lot. This is called the \"baby blues.\" These overwhelming emotions often go away within a couple of days or weeks. But it's important to discuss your feelings with your doctor.  It's easy to get too tired and overwhelmed during the first weeks after childbirth. Don't try to do too much. Get rest whenever you can, accept help from others, and eat well and drink plenty of fluids.  In the first couple of weeks after you give birth, your doctor or midwife may want to check in with you and make a plan for any follow-up care you may need. You will likely have a complete postpartum visit in the first 3 months after delivery. At that time, your doctor or midwife will check on your recovery from childbirth and see how you're doing with your emotions. You may also " discuss your concerns or questions.  Follow-up care is a key part of your treatment and safety. Be sure to make and go to all appointments, and call your doctor if you are having problems. It's also a good idea to know your test results and keep a list of the medicines you take.  How can you care for yourself at home?  Sleep or rest when your baby sleeps.  Get help with household chores from family or friends, if you can. Don't try to do it all yourself.  If you have hemorrhoids or swelling or pain around the opening of your vagina, try using cold and heat. You can put ice or a cold pack on the area for 10 to 20 minutes at a time. Put a thin cloth between the ice and your skin. Also try sitting in a few inches of warm water (sitz bath) 3 times a day and after bowel movements.  Take pain medicines exactly as directed.  If the doctor gave you a prescription medicine for pain, take it as prescribed.  If you are not taking a prescription pain medicine, ask your doctor if you can take an over-the-counter medicine.  Eat more fiber to avoid constipation. Include foods such as whole-grain breads and cereals, raw vegetables, raw and dried fruits, and beans.  Drink plenty of fluids. If you have kidney, heart, or liver disease and have to limit fluids, talk with your doctor before you increase the amount of fluids you drink.  Do not rinse inside your vagina with fluids (douche).  If you have stitches, keep the area clean by pouring or spraying warm water over the area outside your vagina and anus after you use the toilet.  Keep a list of questions to ask your doctor or midwife. Your questions might be about:  Changes in your breasts, such as lumps or soreness.  When to expect your menstrual period to start again.  What form of birth control is best for you.  Weight you have put on during the pregnancy.  Exercise options.  What foods and drinks are best for you, especially if you are breastfeeding.  Problems you might be having  with breastfeeding.  When you can have sex. You may want to talk about lubricants for your vagina.  Any feelings of sadness or restlessness that you are having.  When should you call for help?  Share this information with your partner, family, or a friend. They can help you watch for warning signs.  Call 911  anytime you think you may need emergency care. For example, call if:    You have thoughts of harming yourself, your baby, or another person.     You passed out (lost consciousness).     You have chest pain, are short of breath, or cough up blood.     You have a seizure.   Where to get help 24 hours a day, 7 days a week   If you or someone you know talks about suicide, self-harm, a mental health crisis, a substance use crisis, or any other kind of emotional distress, get help right away. You can:    Call the Suicide and Crisis Lifeline at 988.     Call 3-365-779-TALK (1-164.884.1838).     Text HOME to 179086 to access the Crisis Text Line.   Consider saving these numbers in your phone.  Go to [employer] for more information or to chat online.  Call your doctor now or seek immediate medical care if:    You have signs of hemorrhage (too much bleeding), such as:  Heavy vaginal bleeding. This means that you are soaking through one or more pads in an hour. Or you pass blood clots bigger than an egg.  Feeling dizzy or lightheaded, or you feel like you may faint.  Feeling so tired or weak that you cannot do your usual activities.  A fast or irregular heartbeat.  New or worse belly pain.     You have signs of infection, such as:  A fever.  Frequent or painful urination or blood in your urine.  Vaginal discharge that smells bad.  New or worse belly pain.     You have symptoms of a blood clot in your leg (called a deep vein thrombosis), such as:  Pain in the calf, back of the knee, thigh, or groin.  Swelling in the leg or groin.  A color change on the leg or groin. The skin may be reddish or purplish, depending on  "your usual skin color.     You have signs of preeclampsia, such as:  Sudden swelling of your face, hands, or feet.  New vision problems (such as dimness, blurring, or seeing spots).  A severe headache.     You have signs of heart failure, such as:  New or increased shortness of breath.  New or worse swelling in your legs, ankles, or feet.  Sudden weight gain, such as more than 2 to 3 pounds in a day or 5 pounds in a week.  Feeling so tired or weak that you cannot do your usual activities.     You had spinal or epidural pain relief and have:  New or worse back pain.  Increased pain, swelling, warmth, or redness at the injection site.  Tingling, weakness, or numbness in your legs or groin.   Watch closely for changes in your health, and be sure to contact your doctor if:    Your vaginal bleeding isn't decreasing.     You feel sad, anxious, or hopeless for more than a few days.     You are having problems with your breasts or breastfeeding.   Where can you learn more?  Go to https://www.[employer].net/patiented  Enter Z768 in the search box to learn more about \"Postpartum: Care Instructions.\"  Current as of: July 11, 2023               Content Version: 13.8    7518-2309 [employer].   Care instructions adapted under license by your healthcare professional. If you have questions about a medical condition or this instruction, always ask your healthcare professional. [employer] disclaims any warranty or liability for your use of this information.      " 112

## 2024-01-24 NOTE — CONSULTS
SW received order to see patient to discuss resources related to housing and insurance, in addition to supporting family with any needs surrounding their baby's health condition.    SW visited Ofe and her partner in their [facility] room; they were welcoming of SW visit.  They have a  girl named Bebo and report having an extensive support system on both sides that are involved and excited.  They report having the baby items they need for Bebo.  Ofe states she is unfamiliar with how to get F[employer] added to insurance.  She currently has [employer] insurance through the [facility]; reviewed process to get Fray added and provided [facility] medical assistance line phone number.  Ofe confirmed she already has WIC.  Ofe is on a waitlist for public housing through [facility] and has her interview coming up; SW encouraged them to keep that appointment and feel empowered to ask the questions they have.  SW inquired about Bebo's health status as it pertains to concerns about brain abnormalities.  Ofe and her partner shared they have a strong hanny that Bebo will be okay, and that she is already looking better and acting better than people expected.  Ofe shared her birth experience was good but painful.  Ofe reports she is familiar with postpartum mood concerns and does not have any current worries about herself.  SW encouraged her to reach out to her doctor or child's pediatrician if she starts to have worries.  Ofe's partner complimented the way Ofe has handled everything throughout this hospitalization.  Ofe and her partner deny having further questions or concerns at this time.    Kalley Thurner, MSW, [facility]  Maternal and Child Health   Office: [phone]  Pager: [phone]  After Hours Pager: [phone]  [email or web address]

## 2024-01-24 NOTE — PROGRESS NOTES
Patient is stable and discharged to home today with baby.  Discharge education and instructions reviewed and verbalized understanding  and her questions were addressed as well.  Advised to call clinic if questions arises when she goes home.

## 2024-01-24 NOTE — PROVIDER NOTIFICATION
01/24/24 0450   Provider Notification   Provider Name/Title Connell/G2   Method of Notification Electronic Page   Request Evaluate-Remote   Notification Reason Medication Request     Could you order Tylenol for pt? Thank you.

## 2024-01-24 NOTE — PLAN OF CARE
Data: Vital signs within normal limits. Postpartum checks within normal limits - see flow record. Patient eating and drinking normally. Patient able to empty bladder independently and is up ambulating. Patient performing self cares and is able to care for infant.    Action: Patient medicated during the shift for pain. See MAR. Patient reassessed after each medication and pain was improved - patient stated she was comfortable. See flow record.    Response: Positive attachment behaviors observed with infant. Significant other present.     Plan: Continue to follow care plan and anticipate discharge 01/24/24.

## 2024-01-25 ENCOUNTER — TELEPHONE (OUTPATIENT)
Dept: OBGYN | Facility: CLINIC | Age: 24
End: 2024-01-25
Payer: MEDICAID

## 2024-01-25 NOTE — TELEPHONE ENCOUNTER
----- Message from Farida Holland MD sent at 1/24/2024  8:01 AM CST -----  Regarding: pp follow up  Please call patient and schedule her for a 1 week mood check and 6 week postpartum visit.  Preferred provider is [facility].  -Farida

## 2024-01-26 NOTE — TELEPHONE ENCOUNTER
Called pt, set up a mood check at soonest available point for pt, which was about 2 weeks. Also got 6 wk pp check scheduled. 1/26 SH

## 2024-01-31 LAB — SCANNED LAB RESULT: ABNORMAL

## 2024-02-01 ENCOUNTER — PATIENT OUTREACH (OUTPATIENT)
Dept: CARE COORDINATION | Facility: CLINIC | Age: 24
End: 2024-02-01
Payer: COMMERCIAL

## 2024-02-01 NOTE — PROGRESS NOTES
Clinic Care Coordination Contact  Care Team Conversations    [facility] spoke with pt. She reports she has applied for section 8 housing and is awaiting her appointment to move forward with that. She has no additional housing needs at this time. She states that she delivered her baby and things are going well, she was able to add her baby to insurance. Denies any resource needs at this time and does not desire ongoing involvement from CC. Stated appreciation for the call. No further outreaches planned.    Marilyn Salmon, [facility] Management  [facility]  Phone: [phone]  E-mail: [email or web address]

## 2024-02-05 ENCOUNTER — PATIENT OUTREACH (OUTPATIENT)
Dept: CARE COORDINATION | Facility: CLINIC | Age: 24
End: 2024-02-05

## 2024-02-19 ENCOUNTER — PATIENT OUTREACH (OUTPATIENT)
Dept: CARE COORDINATION | Facility: CLINIC | Age: 24
End: 2024-02-19
Payer: COMMERCIAL

## 2024-02-21 ENCOUNTER — PATIENT OUTREACH (OUTPATIENT)
Dept: CARE COORDINATION | Facility: CLINIC | Age: 24
End: 2024-02-21
Payer: COMMERCIAL

## 2024-03-07 ENCOUNTER — VIRTUAL VISIT (OUTPATIENT)
Dept: OBGYN | Facility: CLINIC | Age: 24
End: 2024-03-07
Payer: COMMERCIAL

## 2024-03-07 ENCOUNTER — MYC MEDICAL ADVICE (OUTPATIENT)
Dept: OBGYN | Facility: CLINIC | Age: 24
End: 2024-03-07

## 2024-03-07 DIAGNOSIS — Z30.011 INITIATION OF OCP (BCP): Primary | ICD-10-CM

## 2024-03-07 DIAGNOSIS — M54.42 LEFT-SIDED LOW BACK PAIN WITH LEFT-SIDED SCIATICA, UNSPECIFIED CHRONICITY: ICD-10-CM

## 2024-03-07 PROCEDURE — 99442 PR PHYSICIAN TELEPHONE EVALUATION 11-20 MIN: CPT | Mod: 93

## 2024-03-07 RX ORDER — NORGESTIMATE AND ETHINYL ESTRADIOL 0.25-0.035
1 KIT ORAL DAILY
Qty: 84 TABLET | Refills: 3 | Status: SHIPPED | OUTPATIENT
Start: 2024-03-07

## 2024-03-07 RX ORDER — GABAPENTIN 100 MG/1
100 CAPSULE ORAL 3 TIMES DAILY
Qty: 90 CAPSULE | Refills: 0 | Status: SHIPPED | OUTPATIENT
Start: 2024-03-07 | End: 2024-04-06

## 2024-03-07 NOTE — PROGRESS NOTES
Ofe is a 23 year old who is being evaluated via a billable telephone visit.      What phone number would you like to be contacted at? [phone]  How would you like to obtain your AVS? Damienhart  Originating Location (pt. Location): Home    Distant Location (provider location):  On-site    CC: pp visit  S: Ofe is a 22 yo   who had  37.1 wk 5lb 1.1oz male infant 24 with Dr. Benites here today in evaluation for PP visit.  -reports no bleeding  -no abdominal pain  -some vaginal discomfort not pain  -no constipation or dysuria  -no incontinence of stool or urine  -not breastfeeding  -mood consistent with anxiety and depression, notes she suffers from anx and dep in general and is currently exacerbated by pp changes and pain   -having low left back pain that refers down leg, it is sharp in nature and making adls difficult, did not have any pain with pregnancy but reports it is severe now and diminishing quality of life  -also desiring birth control, undecided has never taken any  -otherwise feeling well    O:  Vitals:  No vitals were obtained today due to virtual visit.    Physical Exam   General: Alert and no distress //Respiratory: No audible wheeze, cough, or shortness of breath // Psychiatric:  Appropriate affect, tone, and pace of words, very pleasant affect, easy to engage in conversation    Component      Latest Ref Rng 2024  9:00 AM   Hemoglobin      11.7 - 15.7 g/dL 8.4 (L)       Legend:  (L) Low    A/P:  Ofe is a 22 yo   who had  37.1 wk 5lb 1.1oz male infant 24 with Dr. Benites here today in evaluation for PP visit.  -reports mood currently feeling sx of anxiety and depression, declines medications or referral to therapy and is something she personally is working on, no thoughts of harming herself or others, knows there are resources available to her  -last hgb 8.4 stable   -not breastfeeding  -discussed other LARC for cycle control, potentially interested in nexplanon  or IUD, understands these require in office apts  1. Initiation of OCP (BCP)  -not currently breastfeeding, 6 wks pp, no hx migraine with aura, no dvt/pe, no tobacco use, no htn. Discussed must take daily, recc to take negative upt prior to initiation   -discussed if unwanted or unnecessary side effects or btb to report  - norgestimate-ethinyl estradiol (ORTHO-CYCLEN) 0.25-35 MG-MCG tablet; Take 1 tablet by mouth daily  Dispense: 84 tablet; Refill: 3    2. Left-sided low back pain with left-sided sciatica, unspecified chronicity  -recc additionally attending physical therapy, Ofe is having transportation issues and currently staying in [facility], discussed when she feels ready for PT we can place referral, can help with back as well as pelvic pain and rehabilitation   - gabapentin (NEURONTIN) 100 MG capsule; Take 1 capsule (100 mg) by mouth 3 times daily for 30 days  Dispense: 90 capsule; Refill: 0    Reviewed how to contact us for any questions, concerns or future apts            Phone call duration: 19 minutes  Signed Electronically by: JEANNE Gunderson CNP

## 2024-03-07 NOTE — PATIENT INSTRUCTIONS
Russell Thakur,  Here are some websites to read about birth control options:    https://www.bedsider.org/    https://www.plannedparenthood.org/learn/birth-control

## 2024-03-10 ENCOUNTER — HEALTH MAINTENANCE LETTER (OUTPATIENT)
Age: 24
End: 2024-03-10

## 2024-03-13 ENCOUNTER — PATIENT OUTREACH (OUTPATIENT)
Dept: CARE COORDINATION | Facility: CLINIC | Age: 24
End: 2024-03-13
Payer: COMMERCIAL

## 2024-03-22 ENCOUNTER — OFFICE VISIT (OUTPATIENT)
Dept: FAMILY MEDICINE | Facility: CLINIC | Age: 24
End: 2024-03-22
Payer: COMMERCIAL

## 2024-03-22 VITALS
WEIGHT: 110.4 LBS | DIASTOLIC BLOOD PRESSURE: 60 MMHG | RESPIRATION RATE: 16 BRPM | OXYGEN SATURATION: 100 % | BODY MASS INDEX: 20.32 KG/M2 | SYSTOLIC BLOOD PRESSURE: 101 MMHG | TEMPERATURE: 99 F | HEIGHT: 62 IN | HEART RATE: 70 BPM

## 2024-03-22 DIAGNOSIS — K64.8 BLEEDING INTERNAL HEMORRHOIDS: ICD-10-CM

## 2024-03-22 DIAGNOSIS — S39.012A STRAIN OF LUMBAR REGION, INITIAL ENCOUNTER: Primary | ICD-10-CM

## 2024-03-22 PROCEDURE — 99213 OFFICE O/P EST LOW 20 MIN: CPT | Performed by: FAMILY MEDICINE

## 2024-03-22 RX ORDER — BENZOCAINE/MENTHOL 6 MG-10 MG
LOZENGE MUCOUS MEMBRANE DAILY PRN
Qty: 120 G | Refills: 2 | Status: SHIPPED | OUTPATIENT
Start: 2024-03-22

## 2024-03-22 RX ORDER — DOCUSATE SODIUM 100 MG/1
100 CAPSULE, LIQUID FILLED ORAL 2 TIMES DAILY PRN
Qty: 90 CAPSULE | Refills: 1 | Status: SHIPPED | OUTPATIENT
Start: 2024-03-22

## 2024-03-22 RX ORDER — LIDOCAINE 4 G/G
1 PATCH TOPICAL EVERY 24 HOURS
Qty: 30 PATCH | Refills: 2 | Status: SHIPPED | OUTPATIENT
Start: 2024-03-22

## 2024-03-22 ASSESSMENT — ENCOUNTER SYMPTOMS: BACK PAIN: 1

## 2024-03-22 NOTE — PROGRESS NOTES
"  Assessment & Plan       ICD-10-CM    1. Strain of lumbar region, initial encounter  S39.012A REVIEW OF HEALTH MAINTENANCE PROTOCOL ORDERS     Physical Therapy  Referral     tiZANidine (ZANAFLEX) 4 MG tablet     Lidocaine (LIDOCARE) 4 % Patch      2. Bleeding internal hemorrhoids  K64.8 docusate sodium (COLACE) 100 MG capsule     phenylephrine-shark liver oil-mineral oil-petrolatum (PREPARATION H) 0.25-3-14-71.9 % rectal ointment     hydrocortisone (CORTAID) 1 % external cream            Review of external notes as documented elsewhere in note          There are no Patient Instructions on file for this visit.    Katiana Thakur is a 23 year old, presenting for the following health issues:  Back Pain        3/22/2024     3:51 PM   Additional Questions   Roomed by Isabel   Accompanied by none     History of Present Illness       Back Pain:  She presents for follow up of back pain. Patient's back pain is a recurring problem.  Location of back pain:  Right lower back, left lower back and right middle of back  Description of back pain: burning and sharp  Back pain spreads: nowhere    Since patient first noticed back pain, pain is: gradually worsening  Does back pain interfere with her job:  No       She eats 0-1 servings of fruits and vegetables daily.She consumes 2 sweetened beverage(s) daily.   She is taking medications regularly.     Both sides  Mid-left              Review of Systems  Constitutional, HEENT, cardiovascular, pulmonary, gi and gu systems are negative, except as otherwise noted.      Objective    /60   Pulse 70   Temp 99  F (37.2  C) (Temporal)   Resp 16   Ht 1.575 m (5' 2\")   Wt 50.1 kg (110 lb 6.4 oz)   LMP 05/07/2023   SpO2 100%   BMI 20.19 kg/m    Body mass index is 20.19 kg/m .  Physical Exam  Constitutional:       General: She is not in acute distress.     Appearance: Normal appearance.   HENT:      Head: Normocephalic and atraumatic.      Right Ear: External ear normal. "      Left Ear: External ear normal.      Nose: Nose normal.   Eyes:      General: No scleral icterus.     Extraocular Movements: Extraocular movements intact.      Conjunctiva/sclera: Conjunctivae normal.   Cardiovascular:      Rate and Rhythm: Normal rate.   Pulmonary:      Effort: Pulmonary effort is normal.   Musculoskeletal:         General: Normal range of motion.      Cervical back: Normal range of motion and neck supple.      Comments: (+)paraspinal tenderness, (-)midline tenderness, (-)straight leg raise, -HALIMA, -FADIR tests   Skin:     General: Skin is warm and dry.   Neurological:      General: No focal deficit present.      Mental Status: She is alert and oriented to person, place, and time.      Gait: Gait normal.      Deep Tendon Reflexes: Reflexes normal.   Psychiatric:         Behavior: Behavior normal.         Thought Content: Thought content normal.         Judgment: Judgment normal.                    Signed Electronically by: Lit Lees MD

## 2024-03-27 ENCOUNTER — PATIENT OUTREACH (OUTPATIENT)
Dept: CARE COORDINATION | Facility: CLINIC | Age: 24
End: 2024-03-27
Payer: COMMERCIAL

## 2024-04-10 ENCOUNTER — PATIENT OUTREACH (OUTPATIENT)
Dept: CARE COORDINATION | Facility: CLINIC | Age: 24
End: 2024-04-10

## 2024-04-24 ENCOUNTER — PATIENT OUTREACH (OUTPATIENT)
Dept: CARE COORDINATION | Facility: CLINIC | Age: 24
End: 2024-04-24

## 2024-05-23 ENCOUNTER — NURSE TRIAGE (OUTPATIENT)
Dept: FAMILY MEDICINE | Facility: CLINIC | Age: 24
End: 2024-05-23

## 2024-05-23 NOTE — TELEPHONE ENCOUNTER
Pt calling stating that she has been having diarrhea for the last week. It is colorless. She goes 3 times a day. Pt having a decreased appetite. Has a cough. No fever. Has abdominal pain before she needs to use the restroom. No blood in the stool.     Pt wanting a appointment for today. NO availabilities at the [facility]. Pt states that she will call back later to schedule. Pt does not want to look for other availabilities at this time.   Reason for Disposition   Patient wants to be seen    Additional Information   Negative: Shock suspected (e.g., cold/pale/clammy skin, too weak to stand, low BP, rapid pulse)   Negative: Difficult to awaken or acting confused (e.g., disoriented, slurred speech)   Negative: Sounds like a life-threatening emergency to the triager   Negative: Vomiting also present and worse than the diarrhea   Negative: Blood in stool and without diarrhea   Negative: SEVERE abdominal pain (e.g., excruciating) and present > 1 hour   Negative: SEVERE abdominal pain and age > 60 years   Negative: Bloody, black, or tarry bowel movements  (Exception: Chronic-unchanged black-grey bowel movements and is taking iron pills or Pepto-Bismol.)   Negative: SEVERE diarrhea (e.g., 7 or more times / day more than normal) and age > 60 years   Negative: Constant abdominal pain lasting > 2 hours   Negative: Drinking very little and dehydration suspected (e.g., no urine > 12 hours, very dry mouth, very lightheaded)   Negative: Patient sounds very sick or weak to the triager   Negative: SEVERE diarrhea (e.g., 7 or more times / day more than normal) and present > 24 hours (1 day)   Negative: MODERATE diarrhea (e.g., 4-6 times / day more than normal) and present > 48 hours (2 days)   Negative: MODERATE diarrhea (e.g., 4-6 times / day more than normal) and age > 70 years   Negative: Abdominal pain (Exception: Pain clears completely with each passage of diarrhea stool.)   Negative: Fever > 101 F (38.3 C)   Negative:  Blood in the stool  (Exception: Only on toilet paper. Reason: Diarrhea can cause rectal irritation with blood on wiping.)   Negative: Mucus or pus in stool has been present > 2 days and diarrhea is more than mild   Negative: Weak immune system (e.g., HIV positive, cancer chemo, splenectomy, organ transplant, chronic steroids)   Negative: Travel to a foreign country in past month   Negative: Recent antibiotic therapy (i.e., within last 2 months) and diarrhea present > 3 days since antibiotic was stopped   Negative: Recent hospitalization and diarrhea present > 3 days   Negative: Tube feedings (e.g., nasogastric, g-tube, j-tube)   Negative: MILD diarrhea (e.g., 1-3 or more stools than normal in past 24 hours) diarrhea without known cause and present > 7 days    Protocols used: Diarrhea-A-OH  Gina Martinez RN  [facility]

## 2024-08-12 ENCOUNTER — MEDICAL CORRESPONDENCE (OUTPATIENT)
Dept: HEALTH INFORMATION MANAGEMENT | Facility: CLINIC | Age: 24
End: 2024-08-12
Payer: COMMERCIAL

## 2024-09-19 ENCOUNTER — TELEPHONE (OUTPATIENT)
Dept: FAMILY MEDICINE | Facility: CLINIC | Age: 24
End: 2024-09-19
Payer: COMMERCIAL

## 2024-09-19 NOTE — TELEPHONE ENCOUNTER
Patient Quality Outreach    Patient is due for the following:   Cervical Cancer Screening - PAP Needed  Physical Preventive Adult Physical    Next Steps:   Schedule a Adult Preventative    Type of outreach:    Sent [employer] message.      Questions for provider review:    None           Isabel Louise CMA

## 2024-09-23 ENCOUNTER — E-VISIT (OUTPATIENT)
Dept: URGENT CARE | Facility: CLINIC | Age: 24
End: 2024-09-23
Payer: COMMERCIAL

## 2024-09-23 DIAGNOSIS — N89.8 VAGINAL DISCHARGE: Primary | ICD-10-CM

## 2024-09-23 DIAGNOSIS — N94.9 VAGINAL DISCOMFORT: ICD-10-CM

## 2024-09-23 PROCEDURE — 99207 PR NON-BILLABLE SERV PER CHARTING: CPT | Performed by: FAMILY MEDICINE

## 2024-09-23 NOTE — PATIENT INSTRUCTIONS
Vaginitis: Care Instructions  Overview     Vaginitis is soreness or infection of the vagina. This common problem can cause itching and burning. And it can cause a change in vaginal discharge. Sometimes it can cause pain during sex. Vaginitis may be caused by bacteria, yeast, or other germs. Some infections that cause it are caught from a sexual partner. Bath products, spermicides, and douches can irritate the vagina too.  This problem can also happen during and after menopause. A drop in estrogen levels during this time can cause dryness, soreness, and pain during sex.  Your doctor can give you medicine to treat vaginitis. And home care may help you feel better. For certain types of infections, your sex partner(s) must be treated too.  Follow-up care is a key part of your treatment and safety. Be sure to make and go to all appointments, and call your doctor if you are having problems. It's also a good idea to know your test results and keep a list of the medicines you take.  How can you care for yourself at home?  Take your medicines exactly as prescribed. Call your doctor if you think you are having a problem with your medicine.  Ask your doctor if your sex partner(s) also needs treatment.  Do not eat or drink anything that has alcohol if you are taking metronidazole (Flagyl).  Wash your vulva daily with water. You also can use a mild, unscented soap if you want.  Do not use scented bath products. And do not use vaginal sprays or douches.  Change out of wet or damp clothes as soon as possible. Wear cotton underwear. And avoid tight clothing that could increase moisture.  Put a washcloth soaked in cool water on the area to relieve itching. Or you can take cool baths.  If you have dryness because of menopause, use estrogen cream or pills that your doctor prescribes.  Ask your doctor about when it is okay to have sex.  Use a personal lubricant before sex if you have dryness. Examples are Astroglide, K-Y Jelly, and Wet  "Lubricant Gel.  When should you call for help?   Call your doctor now or seek immediate medical care if:    You have a fever.     You have new or increased pain in your vagina or pelvis.     You have new or worse vaginal itching or discharge.   Watch closely for changes in your health, and be sure to contact your doctor if:    You have bleeding other than your period.     You do not get better as expected.   Where can you learn more?  Go to https://www.[employer].net/patiented  Enter F219 in the search box to learn more about \"Vaginitis: Care Instructions.\"  Current as of: November 27, 2023               Content Version: 14.0    0390-4081 [employer].   Care instructions adapted under license by your healthcare professional. If you have questions about a medical condition or this instruction, always ask your healthcare professional. [employer] disclaims any warranty or liability for your use of this information.      "

## 2024-10-31 ENCOUNTER — NURSE TRIAGE (OUTPATIENT)
Dept: FAMILY MEDICINE | Facility: CLINIC | Age: 24
End: 2024-10-31
Payer: COMMERCIAL

## 2024-10-31 NOTE — TELEPHONE ENCOUNTER
"S-(situation): abdominal pain for past few days that is increasing in pain especially with watery bright yellow excretion from bowel.     B-(background): increasing abdominal pain all over.     A-(assessment): patient will need to be assessed.     R-(recommendations): Dr. Cohn recommended being seen in urgent care due to the increasing abdominal pain.   Verified with patient that it was ok to LD when writer called back. Patient currently at work.   Writer called and left message on patient's voicemail to go to . Can call back and speak with a triage nurse if any further questions.   -------------      1. LOCATION: \"Where does it hurt?\"       Upper abdomen.   2. RADIATION: \"Does the pain shoot anywhere else?\" (e.g., chest, back)      Lower stomach. All around the abdomen.   3. ONSET: \"When did the pain begin?\" (e.g., minutes, hours or days ago)       A few days. Patient unsure of exact start date.   4. SUDDEN: \"Gradual or sudden onset?\"      Sudden onset. Ate last: yesterday, Wednesday, around 1 pm. Pain has gotten worse since started.   5. PATTERN \"Does the pain come and go, or is it constant?\"     - If it comes and goes: \"How long does it last?\" \"Do you have pain now?\"      (Note: Comes and goes means the pain is intermittent. It goes away completely between bouts.)     - If constant: \"Is it getting better, staying the same, or getting worse?\"       (Note: Constant means the pain never goes away completely; most serious pain is constant and gets worse.)       Comes and goes. Comes when need to have a bowel movement of diarrhea. Straight water and bright yellow.   6. SEVERITY: \"How bad is the pain?\"  (e.g., Scale 1-10; mild, moderate, or severe)     - MILD (1-3): Doesn't interfere with normal activities, abdomen soft and not tender to touch..      - MODERATE (4-7): Interferes with normal activities or awakens from sleep, abdomen tender to touch.      - SEVERE (8-10): Excruciating pain, doubled over, unable to " "do any normal activities.        When excreting 8-10 pain. Other pain is 7-8/10.   7. RECURRENT SYMPTOM: \"Have you ever had this type of stomach pain before?\" If Yes, ask: \"When was the last time?\" and \"What happened that time?\"       No.   8. AGGRAVATING FACTORS: \"Does anything seem to cause this pain?\" (e.g., foods, stress, alcohol)      Do not know.   9. CARDIAC SYMPTOMS: \"Do you have any of the following symptoms: chest pain, difficulty breathing, sweating, nausea?\"      Nausea slight.   10. OTHER SYMPTOMS: \"Do you have any other symptoms?\" (e.g., back pain, diarrhea, fever, urination pain, vomiting)        Severe diarrhea. Back pain: burning: standing to long. Vomited yesterday: 2x. Headache heavy yesterday. Abdomen is tender.   11. PREGNANCY: \"Is there any chance you are pregnant?\" \"When was your last menstrual period?\"        No.     CARE ADVICE:  - GO TO ED/UCC NOW (OR TO OFFICE WITH PCP APPROVAL): NOTE TO TRIAGER: * Use nurse judgment to select the most appropriate source of care. Consider both the urgency of the patient's symptoms AND what resources may be needed to evaluate and manage the patient.   - drink clear liquids    Reason for Disposition   Patient sounds very sick or weak to the triager    Additional Information   Negative: SEVERE difficulty breathing (e.g., struggling for each breath, speaks in single words)   Negative: Shock suspected (e.g., cold/pale/clammy skin, too weak to stand, low BP, rapid pulse)   Negative: Difficult to awaken or acting confused (e.g., disoriented, slurred speech)   Negative: Passed out (i.e., lost consciousness, collapsed and was not responding)   Negative: Visible sweat on face or sweat is dripping down   Negative: Sounds like a life-threatening emergency to the triager   Negative: Followed an abdomen (stomach) injury   Negative: Chest pain   Negative: Abdominal pain and pregnant < 20 weeks   Negative: Abdominal pain and pregnant 20 or more weeks   Negative: Abdomen " bloating or swelling are main symptoms   Negative: SEVERE abdominal pain (e.g., excruciating)   Negative: Pain lasting > 10 minutes and over 50 years old   Negative: Pain lasting > 10 minutes and over 40 years old and associated chest, arm, neck, upper back, or jaw pain   Negative: Pain lasting > 10 minutes and over 35 years old and at least one cardiac risk factor (e.g., diabetes, high cholesterol, hypertension, obesity, smoker or strong family history of heart disease)   Negative: Pain lasting > 10 minutes and history of heart disease (i.e., heart attack, bypass surgery, angina, angioplasty, CHF)   Negative: Pain lasts > 10 minutes and difficulty breathing   Negative: Vomiting red blood or black (coffee ground) material  (Exception: Few streaks and only occurred once.)   Negative: Blood in bowel movements  (Exception: Blood on surface of BM with constipation.)   Negative: Black or tarry bowel movements  (Exception: Chronic-unchanged black-grey BMs AND is taking iron pills or Pepto-Bismol.)   Negative: Pregnant 20 weeks or more and new hand or face swelling   Negative: MILD TO MODERATE constant pain lasting > 2 hours   Negative: MILD TO MODERATE pain and not relieved by antacid medicine   Negative: Vomiting bile (green color)    Protocols used: Abdominal Pain - Upper-A-OH    FRANKLYN RiosN [facility]

## 2024-11-02 ENCOUNTER — OFFICE VISIT (OUTPATIENT)
Dept: URGENT CARE | Facility: URGENT CARE | Age: 24
End: 2024-11-02
Payer: COMMERCIAL

## 2024-11-02 VITALS
WEIGHT: 108.9 LBS | HEIGHT: 62 IN | HEART RATE: 83 BPM | RESPIRATION RATE: 14 BRPM | OXYGEN SATURATION: 99 % | TEMPERATURE: 99.2 F | SYSTOLIC BLOOD PRESSURE: 127 MMHG | DIASTOLIC BLOOD PRESSURE: 74 MMHG | BODY MASS INDEX: 20.04 KG/M2

## 2024-11-02 DIAGNOSIS — R10.13 ABDOMINAL PAIN, EPIGASTRIC: ICD-10-CM

## 2024-11-02 DIAGNOSIS — R19.7 DIARRHEA OF PRESUMED INFECTIOUS ORIGIN: Primary | ICD-10-CM

## 2024-11-02 DIAGNOSIS — Z11.3 SCREEN FOR STD (SEXUALLY TRANSMITTED DISEASE): ICD-10-CM

## 2024-11-02 LAB
ALBUMIN SERPL BCG-MCNC: 4.2 G/DL (ref 3.5–5.2)
ALP SERPL-CCNC: 82 U/L (ref 40–150)
ALT SERPL W P-5'-P-CCNC: 13 U/L (ref 0–50)
ANION GAP SERPL CALCULATED.3IONS-SCNC: 12 MMOL/L (ref 7–15)
AST SERPL W P-5'-P-CCNC: 22 U/L (ref 0–45)
BASOPHILS # BLD AUTO: 0 10E3/UL (ref 0–0.2)
BASOPHILS NFR BLD AUTO: 0 %
BILIRUB SERPL-MCNC: 0.4 MG/DL
BUN SERPL-MCNC: 11.7 MG/DL (ref 6–20)
CALCIUM SERPL-MCNC: 9 MG/DL (ref 8.8–10.4)
CHLORIDE SERPL-SCNC: 109 MMOL/L (ref 98–107)
CREAT SERPL-MCNC: 0.5 MG/DL (ref 0.51–0.95)
DEPRECATED S PYO AG THROAT QL EIA: NEGATIVE
EGFRCR SERPLBLD CKD-EPI 2021: >90 ML/MIN/1.73M2
EOSINOPHIL # BLD AUTO: 0.1 10E3/UL (ref 0–0.7)
EOSINOPHIL NFR BLD AUTO: 1 %
ERYTHROCYTE [DISTWIDTH] IN BLOOD BY AUTOMATED COUNT: 12.7 % (ref 10–15)
GLUCOSE SERPL-MCNC: 90 MG/DL (ref 70–99)
GROUP A STREP BY PCR: NOT DETECTED
HCO3 SERPL-SCNC: 25 MMOL/L (ref 22–29)
HCT VFR BLD AUTO: 34.5 % (ref 35–47)
HGB BLD-MCNC: 11.3 G/DL (ref 11.7–15.7)
IMM GRANULOCYTES # BLD: 0 10E3/UL
IMM GRANULOCYTES NFR BLD: 0 %
LYMPHOCYTES # BLD AUTO: 2 10E3/UL (ref 0.8–5.3)
LYMPHOCYTES NFR BLD AUTO: 31 %
MCH RBC QN AUTO: 28.1 PG (ref 26.5–33)
MCHC RBC AUTO-ENTMCNC: 32.8 G/DL (ref 31.5–36.5)
MCV RBC AUTO: 86 FL (ref 78–100)
MONOCYTES # BLD AUTO: 0.4 10E3/UL (ref 0–1.3)
MONOCYTES NFR BLD AUTO: 6 %
NEUTROPHILS # BLD AUTO: 3.9 10E3/UL (ref 1.6–8.3)
NEUTROPHILS NFR BLD AUTO: 61 %
PLATELET # BLD AUTO: 297 10E3/UL (ref 150–450)
POTASSIUM SERPL-SCNC: 3.3 MMOL/L (ref 3.4–5.3)
PROT SERPL-MCNC: 7.2 G/DL (ref 6.4–8.3)
RBC # BLD AUTO: 4.02 10E6/UL (ref 3.8–5.2)
SODIUM SERPL-SCNC: 146 MMOL/L (ref 135–145)
WBC # BLD AUTO: 6.4 10E3/UL (ref 4–11)

## 2024-11-02 PROCEDURE — 85025 COMPLETE CBC W/AUTO DIFF WBC: CPT | Performed by: STUDENT IN AN ORGANIZED HEALTH CARE EDUCATION/TRAINING PROGRAM

## 2024-11-02 PROCEDURE — 87491 CHLMYD TRACH DNA AMP PROBE: CPT | Performed by: STUDENT IN AN ORGANIZED HEALTH CARE EDUCATION/TRAINING PROGRAM

## 2024-11-02 PROCEDURE — 80053 COMPREHEN METABOLIC PANEL: CPT | Performed by: STUDENT IN AN ORGANIZED HEALTH CARE EDUCATION/TRAINING PROGRAM

## 2024-11-02 PROCEDURE — 87591 N.GONORRHOEAE DNA AMP PROB: CPT | Performed by: STUDENT IN AN ORGANIZED HEALTH CARE EDUCATION/TRAINING PROGRAM

## 2024-11-02 PROCEDURE — 99214 OFFICE O/P EST MOD 30 MIN: CPT | Performed by: STUDENT IN AN ORGANIZED HEALTH CARE EDUCATION/TRAINING PROGRAM

## 2024-11-02 PROCEDURE — 87651 STREP A DNA AMP PROBE: CPT | Performed by: STUDENT IN AN ORGANIZED HEALTH CARE EDUCATION/TRAINING PROGRAM

## 2024-11-02 PROCEDURE — 36415 COLL VENOUS BLD VENIPUNCTURE: CPT | Performed by: STUDENT IN AN ORGANIZED HEALTH CARE EDUCATION/TRAINING PROGRAM

## 2024-11-02 NOTE — PROGRESS NOTES
Assessment & Plan     Diarrhea of presumed infectious origin  CBC and rapid strep tests are negative.  Awaiting strep PCR results.  She has had epigastric discomfort for the past 6 days and diarrhea.  The pain seems to be when her intestines are cramping from the diarrhea.  She has no focal abdominal pain on exam or rebound tenderness.  She has no urinary symptoms and no fevers or chills.  I suspect viral or bacterial gastroenteritis as the cause for the symptoms and advised stool culture to further evaluate.  Advised to go to the ER if the symptoms are progressively worsening or she develops fevers, chills or vomiting.  Patient agrees with plan of care.  - CBC with platelets and differential  - Enteric Bacteria and Virus Panel by SUMAN Stool  - Streptococcus A Rapid Screen w/Reflex to PCR - Clinic Collect  - NEISSERIA GONORRHOEA PCR  - CBC with platelets and differential  - Enteric Bacteria and Virus Panel by SUMAN Stool  - Group A Streptococcus PCR Throat Swab    Abdominal pain, epigastric  In process. Will advise if any abnormalities that point to etiology of current symptoms.  - Comprehensive metabolic panel (BMP + Alb, Alk Phos, ALT, AST, Total. Bili, TP)  - Comprehensive metabolic panel (BMP + Alb, Alk Phos, ALT, AST, Total. Bili, TP)    Screen for STD (sexually transmitted disease)  In process. Declined wet prep.  - CHLAMYDIA TRACHOMATIS PCR  - NEISSERIA GONORRHOEA PCR     30 minutes spent by me on the date of the encounter doing chart review, review of test results, interpretation of tests, patient visit, and documentation         No follow-ups on file.    JEANNE Garcia [facility] [place]    Katiana Thakur is a 24 year old female who presents to clinic today for the following health issues:  Chief Complaint   Patient presents with    Flank Pain     Having right side shooting pain     Abdominal Pain     Has been having stomach pain for 6 days and unable to eat anything   "   Vaginal Problem     Has been having irritation and vagina has been itchy - feels very dry on vagina - unsure of STD exposure          11/2/2024     4:43 PM   Additional Questions   Roomed by Olga COHN      Review of Systems  Constitutional, HEENT, cardiovascular, pulmonary, GI, , musculoskeletal, neuro, skin, endocrine and psych systems are negative, except as otherwise noted.      Objective    /74   Pulse 83   Temp 99.2  F (37.3  C) (Oral)   Resp 14   Ht 1.575 m (5' 2\")   Wt 49.4 kg (108 lb 14.4 oz)   SpO2 99%   BMI 19.92 kg/m    Physical Exam   GENERAL: alert and no distress  EYES: Eyes grossly normal to inspection, PERRL and conjunctivae and sclerae normal  ABDOMEN: soft, nontender, no hepatosplenomegaly, no masses and bowel sounds normal  MS: no gross musculoskeletal defects noted, no edema  SKIN: no suspicious lesions or rashes  NEURO: Normal strength and tone, mentation intact and speech normal  PSYCH: mentation appears normal, affect normal/bright    Results for orders placed or performed in visit on 11/02/24   CBC with platelets and differential     Status: Abnormal   Result Value Ref Range    WBC Count 6.4 4.0 - 11.0 10e3/uL    RBC Count 4.02 3.80 - 5.20 10e6/uL    Hemoglobin 11.3 (L) 11.7 - 15.7 g/dL    Hematocrit 34.5 (L) 35.0 - 47.0 %    MCV 86 78 - 100 fL    MCH 28.1 26.5 - 33.0 pg    MCHC 32.8 31.5 - 36.5 g/dL    RDW 12.7 10.0 - 15.0 %    Platelet Count 297 150 - 450 10e3/uL    % Neutrophils 61 %    % Lymphocytes 31 %    % Monocytes 6 %    % Eosinophils 1 %    % Basophils 0 %    % Immature Granulocytes 0 %    Absolute Neutrophils 3.9 1.6 - 8.3 10e3/uL    Absolute Lymphocytes 2.0 0.8 - 5.3 10e3/uL    Absolute Monocytes 0.4 0.0 - 1.3 10e3/uL    Absolute Eosinophils 0.1 0.0 - 0.7 10e3/uL    Absolute Basophils 0.0 0.0 - 0.2 10e3/uL    Absolute Immature Granulocytes 0.0 <=0.4 10e3/uL   Streptococcus A Rapid Screen w/Reflex to PCR - Clinic Collect     Status: Normal    Specimen: " Throat; Swab   Result Value Ref Range    Group A Strep antigen Negative Negative   CBC with platelets and differential     Status: Abnormal    Narrative    The following orders were created for panel order CBC with platelets and differential.  Procedure                               Abnormality         Status                     ---------                               -----------         ------                     CBC with platelets and d...[[id number]]  Abnormal            Final result                 Please view results for these tests on the individual orders.

## 2024-11-03 LAB
C TRACH DNA SPEC QL NAA+PROBE: NEGATIVE
N GONORRHOEA DNA SPEC QL NAA+PROBE: NEGATIVE

## 2024-11-21 ENCOUNTER — NURSE TRIAGE (OUTPATIENT)
Dept: FAMILY MEDICINE | Facility: CLINIC | Age: 24
End: 2024-11-21
Payer: COMMERCIAL

## 2024-11-21 NOTE — TELEPHONE ENCOUNTER
"C/O vaginal dryness and itchiness   Itchiness is more prevalent than when seen on 11/2/24  Was seen in UC on 11/2/24   G/C negative   Declined wet prep  States that symptoms have been present for awhile before this visit  Symptoms are improved when on period  Denies abnormal smells  Does report discharge   White, thick, patient used \"cottage cheese\" to describe  Advised patient that given symptoms still present it would be advised to be seen again and receive further testing   \"There is nothing I can do at home?\"  Informed patient that testing would be needed to verify the cause of symptoms before anything can be prescribed  No established PCP so no clinic appointments available to be offered.   Advised to be seen in UC   Patient was not agreeable to this disposition, \"I was already seen. I just want something I can do at home\"  Educated patient that time has passed since they were seen necessitating re-evaluation as symptoms can change and there is more testing that can be completed to identify cause of symptoms  While explaining this patient hung-up on writer, ending the call    No established PCP for [facility]. Has only been seen once ever in primary care for an acute visit. Needs to be seen in UC and in the future establish a PCP.     Reason for Disposition   Symptoms of a yeast infection' (i.e., itchy, white discharge, not bad smelling) and not improved > 3 days following Care Advice    Additional Information   Negative: Pain or burning with passing urine (urination) is main symptom   Negative: Pregnant with vaginal discharge   Negative: SEVERE abdominal pain (e.g., excruciating)   Negative: Patient sounds very sick or weak to the triager   Negative: Yellow or green vaginal discharge and has a fever   Negative: Constant abdominal pain lasting > 2 hours   Negative: Mild lower abdominal pain comes and goes (cramps) that lasts > 24 hours   Negative: Genital area looks infected (e.g., draining sore, spreading redness)   " Negative: Rash is tiny water blisters (3 or more)   Negative: Patient wants to be seen   Negative: Rash (e.g., redness, tiny bumps, sore) of genital area present > 24 hours   Negative: Bad smelling vaginal discharge   Negative: Abnormal color vaginal discharge (i.e., yellow, green, gray)    Protocols used: Vaginal Zqqmbucps-B-GY    Rocio Loza RN  [facility]

## 2024-12-06 ENCOUNTER — OFFICE VISIT (OUTPATIENT)
Dept: URGENT CARE | Facility: URGENT CARE | Age: 24
End: 2024-12-06
Payer: COMMERCIAL

## 2024-12-06 VITALS
OXYGEN SATURATION: 99 % | DIASTOLIC BLOOD PRESSURE: 52 MMHG | HEART RATE: 84 BPM | TEMPERATURE: 99.6 F | SYSTOLIC BLOOD PRESSURE: 97 MMHG | RESPIRATION RATE: 16 BRPM

## 2024-12-06 DIAGNOSIS — R07.0 THROAT PAIN: Primary | ICD-10-CM

## 2024-12-06 DIAGNOSIS — N89.8 VAGINAL ITCHING: ICD-10-CM

## 2024-12-06 LAB
CLUE CELLS: ABNORMAL
DEPRECATED S PYO AG THROAT QL EIA: NEGATIVE
GROUP A STREP BY PCR: NOT DETECTED
TRICHOMONAS, WET PREP: ABNORMAL
WBC'S/HIGH POWER FIELD, WET PREP: ABNORMAL
YEAST, WET PREP: ABNORMAL

## 2024-12-06 PROCEDURE — 87210 SMEAR WET MOUNT SALINE/INK: CPT | Performed by: PHYSICIAN ASSISTANT

## 2024-12-06 PROCEDURE — 99213 OFFICE O/P EST LOW 20 MIN: CPT | Performed by: PHYSICIAN ASSISTANT

## 2024-12-06 PROCEDURE — 87651 STREP A DNA AMP PROBE: CPT | Performed by: PHYSICIAN ASSISTANT

## 2024-12-06 PROCEDURE — G2211 COMPLEX E/M VISIT ADD ON: HCPCS | Performed by: PHYSICIAN ASSISTANT

## 2024-12-06 NOTE — PROGRESS NOTES
Assessment & Plan     Throat pain  Reassured negative RST, exam reassuring. No tonsillitis.   Push fluids, rest and ibuprofen or tylenol for comfort.    RTC for persistent or worsening sx.     - Streptococcus A Rapid Screen w/Reflex to PCR - Clinic Collect  - Group A Streptococcus PCR Throat Swab    Vaginal itching  No sign of infection.   Likely dryness related to hormones post partum as well related to contraception. May use lubrication for intercourse   - Wet prep - Clinic Collect           Madison Dominguez PA-C  [facility] SHALONDA Thakur is a 24 year old female who presents to clinic today for the following health issues:  Chief Complaint   Patient presents with    Throat Pain     Sore throat started today. Decrease appetite. Lost weight.    Vaginal Problem     Vaginal dryness started after giving birth 9 months ago. Mild itching.         12/6/2024     2:02 PM   Additional Questions   Roomed by David Gonzales MA   Accompanied by Self         12/6/2024   Forms   Any forms needing to be completed Yes        HPI    Pt presents to urgent care with concerns re: sore throat x 1 day, no fevers, tolerating po well. No vomiting or diarrhea, no rhinorrhea, congestion, cough.    Also 9 month hx of vaginal dryness which started post partum, on OCP. No odor, mild itching. No discharge change.    No abdomen pain.        Review of Systems  Constitutional, HEENT, cardiovascular, pulmonary, gi and gu systems are negative, except as otherwise noted.      Objective    BP 97/52   Pulse 84   Temp 99.6  F (37.6  C) (Oral)   Resp 16   SpO2 99%   Physical Exam   Pt is in no acute distress and appears well  Ears patent B:  TM s intact, non-injected. All land marks easily visibile    Nasal mucosa is non-edematous, no discharge.    Pharynx: non erythematous, tonsils non hypertrophied, No exudate   Neck supple: no adenopathy  Lungs: CTA  Heart: RRR, no murmur, no thrills or heaves   Ext: no  edema  Skin: no rashes    Results for orders placed or performed in visit on 12/06/24   Streptococcus A Rapid Screen w/Reflex to PCR - Clinic Collect     Status: Normal    Specimen: Throat; Swab   Result Value Ref Range    Group A Strep antigen Negative Negative   Wet prep - Clinic Collect     Status: Abnormal    Specimen: Vagina; Swab   Result Value Ref Range    Trichomonas Absent Absent    Yeast Absent Absent    Clue Cells Absent Absent    WBCs/high power field 1+ (A) None   Group A Streptococcus PCR Throat Swab     Status: Normal    Specimen: Throat; Swab   Result Value Ref Range    Group A strep by PCR Not Detected Not Detected    Narrative    The Xpert Xpress Strep A test, performed on the [employer], is a rapid, qualitative in vitro diagnostic test for the detection of Streptococcus pyogenes (Group A ß-hemolytic Streptococcus, Strep A) in throat swab specimens from patients with signs and symptoms of pharyngitis. The Xpert Xpress Strep A test can be used as an aid in the diagnosis of Group A Streptococcal pharyngitis. The assay is not intended to monitor treatment for Group A Streptococcus infections. The Xpert Xpress Strep A test utilizes an automated real-time polymerase chain reaction (PCR) to detect Streptococcus pyogenes DNA.

## 2024-12-06 NOTE — LETTER
December 6, 2024      Ofe Samuels  [address]  [place] [address]        To Whom It May Concern:    Ofe Samuels  was seen today due to illness thus unable to attend work.  Thank you.          Sincerely,        Madison Dominguez PA-C

## 2024-12-16 ENCOUNTER — OFFICE VISIT (OUTPATIENT)
Dept: FAMILY MEDICINE | Facility: CLINIC | Age: 24
End: 2024-12-16
Payer: COMMERCIAL

## 2024-12-16 VITALS
HEART RATE: 91 BPM | BODY MASS INDEX: 18.88 KG/M2 | TEMPERATURE: 99.5 F | DIASTOLIC BLOOD PRESSURE: 66 MMHG | SYSTOLIC BLOOD PRESSURE: 104 MMHG | WEIGHT: 102.6 LBS | HEIGHT: 62 IN | OXYGEN SATURATION: 100 % | RESPIRATION RATE: 18 BRPM

## 2024-12-16 DIAGNOSIS — K64.8 BLEEDING INTERNAL HEMORRHOIDS: ICD-10-CM

## 2024-12-16 DIAGNOSIS — S39.012A STRAIN OF LUMBAR REGION, INITIAL ENCOUNTER: ICD-10-CM

## 2024-12-16 DIAGNOSIS — Z86.39 HISTORY OF IRON DEFICIENCY: ICD-10-CM

## 2024-12-16 DIAGNOSIS — N92.6 MENSTRUAL IRREGULARITY: ICD-10-CM

## 2024-12-16 DIAGNOSIS — Z12.4 CERVICAL CANCER SCREENING: Primary | ICD-10-CM

## 2024-12-16 DIAGNOSIS — R53.83 FATIGUE, UNSPECIFIED TYPE: ICD-10-CM

## 2024-12-16 DIAGNOSIS — R11.0 NAUSEA: ICD-10-CM

## 2024-12-16 DIAGNOSIS — N92.6 MENSTRUAL IRREGULARITY: Primary | ICD-10-CM

## 2024-12-16 DIAGNOSIS — Z30.011 INITIATION OF OCP (BCP): ICD-10-CM

## 2024-12-16 LAB
ALBUMIN SERPL BCG-MCNC: 4.5 G/DL (ref 3.5–5.2)
ALP SERPL-CCNC: 97 U/L (ref 40–150)
ALT SERPL W P-5'-P-CCNC: 7 U/L (ref 0–50)
ANION GAP SERPL CALCULATED.3IONS-SCNC: 12 MMOL/L (ref 7–15)
AST SERPL W P-5'-P-CCNC: 18 U/L (ref 0–45)
BILIRUB SERPL-MCNC: 0.4 MG/DL
BUN SERPL-MCNC: 12.7 MG/DL (ref 6–20)
CALCIUM SERPL-MCNC: 9.5 MG/DL (ref 8.8–10.4)
CHLORIDE SERPL-SCNC: 106 MMOL/L (ref 98–107)
CREAT SERPL-MCNC: 0.53 MG/DL (ref 0.51–0.95)
EGFRCR SERPLBLD CKD-EPI 2021: >90 ML/MIN/1.73M2
ERYTHROCYTE [DISTWIDTH] IN BLOOD BY AUTOMATED COUNT: 13.2 % (ref 10–15)
FERRITIN SERPL-MCNC: 55 NG/ML (ref 6–175)
GLUCOSE SERPL-MCNC: 96 MG/DL (ref 70–99)
HCO3 SERPL-SCNC: 21 MMOL/L (ref 22–29)
HCT VFR BLD AUTO: 37.9 % (ref 35–47)
HGB BLD-MCNC: 12.3 G/DL (ref 11.7–15.7)
IRON BINDING CAPACITY (ROCHE): 253 UG/DL (ref 240–430)
IRON SATN MFR SERPL: 10 % (ref 15–46)
IRON SERPL-MCNC: 26 UG/DL (ref 37–145)
MCH RBC QN AUTO: 27.8 PG (ref 26.5–33)
MCHC RBC AUTO-ENTMCNC: 32.5 G/DL (ref 31.5–36.5)
MCV RBC AUTO: 86 FL (ref 78–100)
PLATELET # BLD AUTO: 325 10E3/UL (ref 150–450)
POTASSIUM SERPL-SCNC: 4.1 MMOL/L (ref 3.4–5.3)
PROT SERPL-MCNC: 7.4 G/DL (ref 6.4–8.3)
RBC # BLD AUTO: 4.42 10E6/UL (ref 3.8–5.2)
SODIUM SERPL-SCNC: 139 MMOL/L (ref 135–145)
TSH SERPL DL<=0.005 MIU/L-ACNC: 0.31 UIU/ML (ref 0.3–4.2)
VIT B12 SERPL-MCNC: 339 PG/ML (ref 232–1245)
VIT D+METAB SERPL-MCNC: 14 NG/ML (ref 20–50)
WBC # BLD AUTO: 8.4 10E3/UL (ref 4–11)

## 2024-12-16 PROCEDURE — 80053 COMPREHEN METABOLIC PANEL: CPT

## 2024-12-16 PROCEDURE — 85027 COMPLETE CBC AUTOMATED: CPT

## 2024-12-16 PROCEDURE — 99214 OFFICE O/P EST MOD 30 MIN: CPT

## 2024-12-16 PROCEDURE — 36415 COLL VENOUS BLD VENIPUNCTURE: CPT

## 2024-12-16 PROCEDURE — 82728 ASSAY OF FERRITIN: CPT

## 2024-12-16 PROCEDURE — 82607 VITAMIN B-12: CPT

## 2024-12-16 PROCEDURE — 84443 ASSAY THYROID STIM HORMONE: CPT

## 2024-12-16 PROCEDURE — 83540 ASSAY OF IRON: CPT

## 2024-12-16 PROCEDURE — 83550 IRON BINDING TEST: CPT

## 2024-12-16 PROCEDURE — 82306 VITAMIN D 25 HYDROXY: CPT

## 2024-12-16 RX ORDER — DOCUSATE SODIUM 100 MG/1
100 CAPSULE, LIQUID FILLED ORAL 2 TIMES DAILY PRN
Qty: 90 CAPSULE | Refills: 1 | Status: SHIPPED | OUTPATIENT
Start: 2024-12-16

## 2024-12-16 RX ORDER — IBUPROFEN 600 MG/1
600 TABLET, FILM COATED ORAL EVERY 6 HOURS PRN
Qty: 60 TABLET | Refills: 0 | Status: SHIPPED | OUTPATIENT
Start: 2024-12-16

## 2024-12-16 RX ORDER — NORGESTIMATE AND ETHINYL ESTRADIOL 0.25-0.035
1 KIT ORAL DAILY
Qty: 84 TABLET | Refills: 3 | Status: SHIPPED | OUTPATIENT
Start: 2024-12-16

## 2024-12-16 RX ORDER — METOCLOPRAMIDE 5 MG/1
5 TABLET ORAL 3 TIMES DAILY PRN
Qty: 90 TABLET | Refills: 3 | Status: SHIPPED | OUTPATIENT
Start: 2024-12-16

## 2024-12-16 ASSESSMENT — PAIN SCALES - GENERAL: PAINLEVEL_OUTOF10: NO PAIN (0)

## 2024-12-16 NOTE — PROGRESS NOTES
"  {PROVIDER CHARTING PREFERENCE:881852}    Subjective   Ofe is a 24 year old, presenting for the following health issues:  Contraception (Nexplanon: Pt reports Nexplanon was inserted approx. In June 2024 at [facility] //Pt reports that she wants to talk about Nexplanon removal. Pt was informed that she may need a referral from provider for removal. Pt expressed understanding. /) and Weight Loss (Pt report that she is concerned with losing weight. )        12/6/2024     2:02 PM   Additional Questions   Roomed by David Gonzales MA   Accompanied by Self     History of Present Illness       Reason for visit:  Birth control removal   She is taking medications regularly.     Vaginal dryness, weight loss.  Very low appetite.     Nexplanon for about 6 months.  Constant bleeding.   3 weeks out of month will be bleeding.      {ROS Picklists (Optional):576984}      Objective    /66 (BP Location: Left arm, Patient Position: Sitting, Cuff Size: Adult Regular)   Pulse 91   Temp 99.5  F (37.5  C) (Tympanic)   Resp 18   Ht 1.575 m (5' 2.01\")   Wt 46.5 kg (102 lb 9.6 oz)   LMP 12/02/2024 (Approximate)   SpO2 100%   Breastfeeding No   BMI 18.76 kg/m    Body mass index is 18.76 kg/m .    Physical Exam   {Exam List (Optional):435865}    {Diagnostic Test Results (Optional):355039}        Signed Electronically by: JEANNE Pierce CNP    " "  Accompanied by Self     History of Present Illness       Reason for visit:  Birth control removal   She is taking medications regularly.       Has had her Nexplanon for about 6 months.  Constant bleeding/spotting.   3 weeks out of month will be spotting has also developed vaginal dryness.  Would like her Nexplanon removed.  She also reports that she has noticed some unintentional weight loss.  Has a very low appetite and more fatigue.  Feels like she is full early and cannot continue eating.  Has some constipation and history of hemorrhoids.  Would like labs completed to see if she needs any vitamins to help with her symptoms.        Review of Systems  Constitutional, HEENT, cardiovascular, pulmonary, gi and gu systems are negative, except as otherwise noted.      Objective    /66 (BP Location: Left arm, Patient Position: Sitting, Cuff Size: Adult Regular)   Pulse 91   Temp 99.5  F (37.5  C) (Tympanic)   Resp 18   Ht 1.575 m (5' 2.01\")   Wt 46.5 kg (102 lb 9.6 oz)   LMP 12/02/2024 (Approximate)   SpO2 100%   Breastfeeding No   BMI 18.76 kg/m    Body mass index is 18.76 kg/m .    Physical Exam   GENERAL: alert and no distress  NECK: no adenopathy, no asymmetry, masses, or scars  RESP: lungs clear to auscultation - no rales, rhonchi or wheezes  CV: regular rate and rhythm, normal S1 S2, no S3 or S4, no murmur, click or rub, no peripheral edema  ABDOMEN: soft, nontender, no hepatosplenomegaly, no masses and bowel sounds normal  SKIN: no suspicious lesions or rashes  PSYCH: mentation appears normal, affect normal/bright            Signed Electronically by: JEANNE Pierce CNP    "

## 2024-12-18 DIAGNOSIS — E55.9 VITAMIN D DEFICIENCY: Primary | ICD-10-CM

## 2024-12-18 RX ORDER — ERGOCALCIFEROL 1.25 MG/1
50000 CAPSULE, LIQUID FILLED ORAL WEEKLY
Qty: 6 CAPSULE | Refills: 0 | Status: SHIPPED | OUTPATIENT
Start: 2024-12-18 | End: 2025-01-23

## 2024-12-23 ENCOUNTER — TELEPHONE (OUTPATIENT)
Dept: FAMILY MEDICINE | Facility: CLINIC | Age: 24
End: 2024-12-23
Payer: COMMERCIAL

## 2025-02-24 ENCOUNTER — TELEPHONE (OUTPATIENT)
Dept: FAMILY MEDICINE | Facility: CLINIC | Age: 25
End: 2025-02-24
Payer: COMMERCIAL

## 2025-02-24 NOTE — TELEPHONE ENCOUNTER
Patient Quality Outreach    Patient is due for the following:   Cervical Cancer Screening - PAP Needed  Physical Preventive Adult Physical    Action(s) Taken:   Schedule a Adult Preventative    Type of outreach:    Chart review performed, no outreach needed. Patient goes to another clinic.    Questions for provider review:    None           Isabel Louise CMA

## 2025-03-09 ENCOUNTER — OFFICE VISIT (OUTPATIENT)
Dept: URGENT CARE | Facility: URGENT CARE | Age: 25
End: 2025-03-09
Payer: COMMERCIAL

## 2025-03-09 VITALS
WEIGHT: 97.4 LBS | OXYGEN SATURATION: 97 % | TEMPERATURE: 99.6 F | HEART RATE: 122 BPM | RESPIRATION RATE: 16 BRPM | SYSTOLIC BLOOD PRESSURE: 110 MMHG | BODY MASS INDEX: 17.81 KG/M2 | DIASTOLIC BLOOD PRESSURE: 70 MMHG

## 2025-03-09 DIAGNOSIS — R05.1 ACUTE COUGH: ICD-10-CM

## 2025-03-09 DIAGNOSIS — J11.1 INFLUENZA-LIKE ILLNESS: Primary | ICD-10-CM

## 2025-03-09 LAB
FLUAV AG SPEC QL IA: NEGATIVE
FLUBV AG SPEC QL IA: NEGATIVE

## 2025-03-09 PROCEDURE — 3074F SYST BP LT 130 MM HG: CPT | Performed by: FAMILY MEDICINE

## 2025-03-09 PROCEDURE — 99213 OFFICE O/P EST LOW 20 MIN: CPT | Performed by: FAMILY MEDICINE

## 2025-03-09 PROCEDURE — 3078F DIAST BP <80 MM HG: CPT | Performed by: FAMILY MEDICINE

## 2025-03-09 PROCEDURE — 87804 INFLUENZA ASSAY W/OPTIC: CPT | Performed by: FAMILY MEDICINE

## 2025-03-09 RX ORDER — OMEGA-3 FATTY ACIDS/FISH OIL 300-1000MG
400 CAPSULE ORAL EVERY 6 HOURS PRN
Qty: 60 CAPSULE | Refills: 0 | Status: SHIPPED | OUTPATIENT
Start: 2025-03-09

## 2025-03-09 RX ORDER — BENZONATATE 200 MG/1
200 CAPSULE ORAL 3 TIMES DAILY PRN
Qty: 30 CAPSULE | Refills: 0 | Status: SHIPPED | OUTPATIENT
Start: 2025-03-09

## 2025-03-09 RX ORDER — ACETAMINOPHEN 500 MG
500-1000 TABLET ORAL EVERY 6 HOURS PRN
Qty: 60 TABLET | Refills: 0 | Status: SHIPPED | OUTPATIENT
Start: 2025-03-09

## 2025-03-09 NOTE — LETTER
March 9, 2025      Ofe Samuels  [address]  [place] [address]        To Whom It May Concern:    Ofe Samuels  was seen on 3/9/2025.  Please excuse her 3/9/2025  through 3/11/2025 due to illness.        Sincerely,        Elly Cisse MD    Electronically signed

## 2025-03-09 NOTE — PROGRESS NOTES
Assessment:       Influenza-like illness    Acute cough  - Influenza A & B Antigen - Clinic Collect    Plan:     Patient with influenza-like illness though influenza testing negative.  Recommend symptomatic care at home with Tylenol or ibuprofen, rest, fluids.  Follow-up if symptoms getting worse or not improving over the next 3 to 4 days.  Patient agreeable with this plan.    MEDICATIONS:   No orders of the defined types were placed in this encounter.      Subjective:       24 year old female presents for evaluation of a 1 day history of sudden onset of dry cough, body aches, headache, chills, and fever.  Her appetite has been decreased.  She did have 1 episode of diarrhea.  Denies much nasal congestion or sore throat.  Denies ear pain.  No shortness of breath or wheezing.    Patient Active Problem List   Diagnosis    Supervision of high risk pregnancy in third trimester    Iron deficiency anemia of mother during pregnancy    Pregnancy complicated by fetal cerebral ventriculomegaly    Pregnancy affected by fetal growth restriction    Pregnancy       Past Medical History:   Diagnosis Date    Known health problems: none        Past Surgical History:   Procedure Laterality Date    NO HISTORY OF SURGERY         Current Outpatient Medications   Medication Sig Dispense Refill    ibuprofen (ADVIL/MOTRIN) 600 MG tablet Take 1 tablet (600 mg) by mouth every 6 hours as needed for moderate pain. Start after delivery 60 tablet 0    docusate sodium (COLACE) 100 MG capsule Take 1 capsule (100 mg) by mouth 2 times daily as needed for constipation (constipation). (Patient not taking: Reported on 3/9/2025) 90 capsule 1    ferrous sulfate (FEROSUL) 325 (65 Fe) MG tablet Take 1 tablet (325 mg) by mouth daily (with breakfast) (Patient not taking: Reported on 12/6/2024) 100 tablet 1    gabapentin (NEURONTIN) 100 MG capsule Take 1 capsule (100 mg) by mouth 3 times daily for 30 days 90 capsule 0    metoclopramide (REGLAN) 5 MG tablet  Take 1 tablet (5 mg) by mouth 3 times daily as needed for vomiting (nausea). Take before meals (Patient not taking: Reported on 3/9/2025) 90 tablet 3    norgestimate-ethinyl estradiol (ORTHO-CYCLEN) 0.25-35 MG-MCG tablet Take 1 tablet by mouth daily. (Patient not taking: Reported on 3/9/2025) 84 tablet 3    phenylephrine-shark liver oil-mineral oil-petrolatum (PREPARATION H) 0.25-3-14-71.9 % rectal ointment Place rectally daily as needed for hemorrhoids (Patient not taking: Reported on 12/6/2024) 28.4 g 3    Prenatal Vit-Fe Fumarate-FA (PRENATAL VITAMINS) 28-0.8 MG TABS Take 1 tablet by mouth daily at 2 pm (Patient not taking: Reported on 12/6/2024) 100 tablet 1     No current facility-administered medications for this visit.       No Known Allergies    No family history on file.    Social History     Socioeconomic History    Marital status: Single   Tobacco Use    Smoking status: Never     Passive exposure: Never    Smokeless tobacco: Never    Tobacco comments:     Marijuana - used to smoke   Vaping Use    Vaping status: Never Used   Substance and Sexual Activity    Alcohol use: Never    Drug use: Never    Sexual activity: Yes       Review of Systems  Pertinent items are noted in HPI.      Objective:                     General Appearance:    /70 (BP Location: Right arm, Patient Position: Sitting, Cuff Size: Adult Regular)   Pulse (!) 122   Temp 99.6  F (37.6  C) (Oral)   Resp 16   Wt 44.2 kg (97 lb 6.4 oz)   SpO2 97%   BMI 17.81 kg/m          Alert, mildly ill-appearing but in no distress   Head:    Normocephalic, without obvious abnormality, atraumatic   Eyes:    Conjunctiva/corneas clear   Ears:    Normal TM's without erythema or bulging. Normal external ear canals, both ears   Nose:   Nares normal, septum midline, mucosa normal, no drainage    or sinus tenderness   Throat:   Lips, mucosa, and tongue normal; teeth and gums normal.  No tonsilar hypertrophy or exudate.   Neck:   Supple, symmetrical,  trachea midline, no adenopathy    Lungs:     Clear to auscultation bilaterally without wheezes, rales, or rhonchi, respirations unlabored    Heart:    Regular rate and rhythm, S1 and S2 normal, no murmur, rub or gallop       Extremities:   Extremities normal, atraumatic, no cyanosis or edema   Skin:   Skin color, texture, turgor normal, no rashes or lesions           Results for orders placed or performed in visit on 03/09/25   Influenza A & B Antigen - Clinic Collect     Status: Normal    Specimen: Nose; Swab   Result Value Ref Range    Influenza A antigen Negative Negative    Influenza B antigen Negative Negative    Narrative    Test results must be correlated with clinical data. If necessary, results should be confirmed by a molecular assay or viral culture.       This note has been dictated using voice recognition software. Any grammatical or context distortions are unintentional and inherent to the software

## 2025-03-10 ENCOUNTER — NURSE TRIAGE (OUTPATIENT)
Dept: FAMILY MEDICINE | Facility: CLINIC | Age: 25
End: 2025-03-10
Payer: COMMERCIAL

## 2025-03-10 NOTE — TELEPHONE ENCOUNTER
Provider Response to 2nd Level Triage Request    I have reviewed the RN documentation. My recommendation is:  If symptoms worsen would recommend she go to urgent care. Otherwise should be seen in office tomorrow due to worsening symptoms with either same day provider or can use an approval slot with me if needed.

## 2025-03-10 NOTE — TELEPHONE ENCOUNTER
Nurse Triage SBAR    Is this a 2nd Level Triage? YES, LICENSED PRACTITIONER REVIEW IS REQUIRED    Situation: breathing issues/flu like illness    Background: symptoms started Saturday 3/8. Seen in UC yesterday, negative for Flu. Given benzonatate, tylenol, ibuprofen.    Assessment: states symptoms are worse than yesterday, now feeling like she is breathing rapidly/discomfort when breathing. Abdominal pain from coughing, diarrhea today, not eating much, but able to hydrate. Feels fatigued, not sleeping well. Maybe a little dizzy with position changes.     Protocol Recommended Disposition:   Go To Office Now    Recommendation: no available appts in clinic today, offered UC, pt states she will see if symptoms worsen and then would go in. Would like recommendation from PCP.      Routed to provider    Does the patient meet one of the following criteria for ADS visit consideration? 16+ years old, with an [facility] PCP     TIP  Providers, please consider if this condition is appropriate for management at one of our Acute and Diagnostic Services sites.     If patient is a good candidate, please use dotphrase <dot>triageresponse and select Refer to ADS to document.    Reason for Disposition   MILD difficulty breathing (e.g., minimal/no SOB at rest, SOB with walking, pulse < 100) of new-onset or worse than normal    Additional Information   Negative: SEVERE difficulty breathing (e.g., struggling for each breath, speaks in single words, pulse > 120)   Negative: Breathing stopped and hasn't returned   Negative: Choking on something   Negative: Bluish (or gray) lips or face   Negative: Difficult to awaken or acting confused (e.g., disoriented, slurred speech)   Negative: Passed out (e.g., fainted, lost consciousness, blacked out and was not responding)   Negative: Wheezing started suddenly after medicine, an allergic food, or bee sting   Negative: Stridor (harsh sound while breathing in)   Negative: Slow, shallow and weak  "breathing   Negative: Sounds like a life-threatening emergency to the triager   Negative: Chest pain   Negative: Wheezing (high pitched whistling sound) and previous asthma attacks or use of asthma medicines   Negative: Breathing difficulty and within 14 days of COVID-19 EXPOSURE (close contact) with someone diagnosed with COVID-19 (e.g., COVID test positive)   Negative: Breathing difficulty and COVID-19 is widespread in the community   Negative: Breathing diffculty and only present when coughing   Negative: Breathing difficulty and only from stuffy nose   Negative: Breathing diffculty and only from stuffy nose or runny nose from common cold   Negative: MODERATE difficulty breathing (e.g., speaks in phrases, SOB even at rest, pulse 100-120) of new-onset or worse than normal   Negative: Oxygen level (e.g., pulse oximetry) 90% or lower   Negative: Wheezing can be heard across the room   Negative: Drooling or spitting out saliva (because can't swallow)   Negative: Any history of prior \"blood clot\" in leg or lungs   Negative: Illness requiring prolonged bedrest in past month (e.g., immobilization, long hospital stay)   Negative: Hip or leg fracture (broken bone) in past month (or had cast on leg or ankle in past month)   Negative: Major surgery in the past month   Negative: Long-distance travel in past month (e.g., car, bus, train, plane; with trip lasting 6 or more hours)   Negative: Cancer treatment in past six months (or has cancer now)   Negative: Extra heartbeats, irregular heart beating, or heart is beating very fast (i.e., 'palpitations')   Negative: Fever > 103 F (39.4 C)   Negative: Fever > 101 F (38.3 C) and over 60 years of age   Negative: Fever > 100 F (37.8 C) and bedridden (e.g., nursing home patient, stroke, chronic illness, recovering from surgery)   Negative: Fever > 100 F (37.8 C) and diabetes mellitus or weak immune system (e.g., HIV positive, cancer chemo, splenectomy, organ transplant, chronic " steroids)   Negative: Periods where breathing stops and then resumes normally and bedridden (e.g., nursing home patient, CVA)   Negative: Pregnant or postpartum (from 0 to 6 weeks after delivery)   Negative: Patient sounds very sick or weak to the triager    Protocols used: Breathing Difficulty-A-OH

## 2025-03-10 NOTE — TELEPHONE ENCOUNTER
Incoming call from patient.  Relayed PCP recommendation and scheduled patient for visit tomorrow with PCP.  Advised patient to be seen sooner if her symptoms worsen

## 2025-03-11 ENCOUNTER — OFFICE VISIT (OUTPATIENT)
Dept: FAMILY MEDICINE | Facility: CLINIC | Age: 25
End: 2025-03-11
Payer: COMMERCIAL

## 2025-03-11 VITALS
OXYGEN SATURATION: 100 % | SYSTOLIC BLOOD PRESSURE: 90 MMHG | DIASTOLIC BLOOD PRESSURE: 59 MMHG | BODY MASS INDEX: 18.26 KG/M2 | HEART RATE: 94 BPM | TEMPERATURE: 98.9 F | WEIGHT: 99.2 LBS | RESPIRATION RATE: 15 BRPM | HEIGHT: 62 IN

## 2025-03-11 DIAGNOSIS — R11.0 NAUSEA: ICD-10-CM

## 2025-03-11 DIAGNOSIS — G89.29 CHRONIC BILATERAL LOW BACK PAIN WITHOUT SCIATICA: ICD-10-CM

## 2025-03-11 DIAGNOSIS — J06.9 UPPER RESPIRATORY TRACT INFECTION, UNSPECIFIED TYPE: Primary | ICD-10-CM

## 2025-03-11 DIAGNOSIS — M54.50 CHRONIC BILATERAL LOW BACK PAIN WITHOUT SCIATICA: ICD-10-CM

## 2025-03-11 LAB — HCG UR QL: NEGATIVE

## 2025-03-11 PROCEDURE — 3074F SYST BP LT 130 MM HG: CPT

## 2025-03-11 PROCEDURE — 81025 URINE PREGNANCY TEST: CPT

## 2025-03-11 PROCEDURE — G2211 COMPLEX E/M VISIT ADD ON: HCPCS

## 2025-03-11 PROCEDURE — 1126F AMNT PAIN NOTED NONE PRSNT: CPT

## 2025-03-11 PROCEDURE — 3078F DIAST BP <80 MM HG: CPT

## 2025-03-11 PROCEDURE — 99214 OFFICE O/P EST MOD 30 MIN: CPT

## 2025-03-11 RX ORDER — PREDNISONE 20 MG/1
20 TABLET ORAL DAILY
Qty: 5 TABLET | Refills: 0 | Status: SHIPPED | OUTPATIENT
Start: 2025-03-11 | End: 2025-03-16

## 2025-03-11 RX ORDER — ALBUTEROL SULFATE 90 UG/1
2 INHALANT RESPIRATORY (INHALATION) EVERY 6 HOURS PRN
Qty: 18 G | Refills: 0 | Status: SHIPPED | OUTPATIENT
Start: 2025-03-11

## 2025-03-11 ASSESSMENT — PAIN SCALES - GENERAL: PAINLEVEL_OUTOF10: NO PAIN (0)

## 2025-03-11 NOTE — PROGRESS NOTES
Assessment & Plan     Upper respiratory tract infection, unspecified type  Expiratory wheezes on exam today. Symptoms x7 days but worse the last 3. Will treat for reactive airway/bronchitis with short course of prednisone and albuterol inhaler. Reviewed expected therapeutic effects, potential side effects, and proper administration. I do not feel like antibiotics are needed at this time. Return precautions discussed.   - predniSONE (DELTASONE) 20 MG tablet  Dispense: 5 tablet; Refill: 0  - albuterol (PROAIR HFA/PROVENTIL HFA/VENTOLIN HFA) 108 (90 Base) MCG/ACT inhaler  Dispense: 18 g; Refill: 0    Nausea  Low appetite and nausea. Similar to pregnancy nausea. Will check hcg to rule this out. She does have nexplanon in place and feels like this may be causing her symptoms so she has an appointment to get this removed. Also feels like her culture's food makes her  more nauseated and struggles to find foods that are nutritionally balanced. Will refer her to nutrition. Continue reglan as needed. If no improvement could consider referral to GI.   - Adult Nutrition  Referral  - HCG qualitative urine    Chronic bilateral low back pain without sciatica  Ongoing since after giving birth to her baby over a year ago. Did not follow up with PT referral previously and would like to do this now. Discussed importance of routine physical activity and core strength post-partum.   - Physical Therapy  Referral    Plan to follow up if symptoms do not improve or worsen.      The longitudinal plan of care for the diagnosis(es)/condition(s) as documented were addressed during this visit. Due to the added complexity in care, I will continue to support Ofe in the subsequent management and with ongoing continuity of care.      Subjective   Ofe is a 24 year old, presenting for the following health issues:  History of Present Illness (Cough, fever, body aches, chills,sweats, diarrhea,abdominal pain, low back  "pain,fatigue,SOB, low appetite/Was seen on 3/9 at [facility])        3/11/2025     2:14 PM   Additional Questions   Roomed by eloise lara     History of Present Illness       [facility]   She is taking medications regularly.      Symptoms for about a week. Seen at urgent care 3/9 - negative flu. Felt the worse for the last 3 days with URI diarrhea, poor appetite but feeling a little bit better today. Diarrhea getting better. Breathing was very difficult yesterday.  Today getting better but still having  moments where breathing will feel tight.  Very fatigued still.  Benzonatate has been helpful for the cough.     Nausea - feels similar to when she was pregnant. Has nexplanon - is getting this removed this Friday as she thinks this could be causing her symptoms. Also feels like it is the types of foods her culture eats that makes her feel nauseated. Feels like she has trouble getting adequate nutrition. Feels weak. Metoclopramide has been helpful.     Back pain started after her baby - just turned 1 year. Standing for long periods of time at work - having more back pain. Was supposed to go to PT but  never did.   No routine exercise.       Review of Systems  Constitutional, HEENT, cardiovascular, pulmonary, gi and gu systems are negative, except as otherwise noted.      Objective    BP 90/59 (BP Location: Left arm, Patient Position: Sitting, Cuff Size: Adult Regular)   Pulse 94   Temp 98.9  F (37.2  C)   Resp 15   Ht 1.575 m (5' 2\")   Wt 45 kg (99 lb 3.2 oz)   LMP  (LMP Unknown)   SpO2 100%   Breastfeeding No   BMI 18.14 kg/m    Body mass index is 18.14 kg/m .    Physical Exam   GENERAL: alert and no distress  HENT: normal cephalic/atraumatic, ear canals and TM's normal, rhinorrhea clear, and oral mucous membranes moist  RESP: expiratory wheezes throughout  CV: regular rate and rhythm, normal S1 S2, no S3 or S4, no murmur, click or rub, no peripheral edema  ABDOMEN: soft, nontender, no " hepatosplenomegaly, no masses and bowel sounds normal  PSYCH: mentation appears normal, affect normal/bright          Signed Electronically by: JEANNE Pierce CNP

## 2025-03-17 ENCOUNTER — MYC MEDICAL ADVICE (OUTPATIENT)
Dept: PHYSICAL THERAPY | Facility: REHABILITATION | Age: 25
End: 2025-03-17

## 2025-03-17 NOTE — TELEPHONE ENCOUNTER
Called and spoke with patient in regards to her missed physical therapy appointment today 3/17/25 at 3:20 pm. She reports that she forgot.     Confirmed her next appointment for 3/24 at 12;40 pm and confirmed that she still prefers a video visit.

## 2025-03-26 ENCOUNTER — MYC MEDICAL ADVICE (OUTPATIENT)
Dept: FAMILY MEDICINE | Facility: CLINIC | Age: 25
End: 2025-03-26
Payer: COMMERCIAL

## 2025-04-02 NOTE — TELEPHONE ENCOUNTER
Pt called and will be going to [facility] for Nutrition services  Faxed to:  [phone] - Phalen Meyer  And  [phone] - Joselin Araujo

## 2025-04-04 ENCOUNTER — NURSE TRIAGE (OUTPATIENT)
Dept: FAMILY MEDICINE | Facility: CLINIC | Age: 25
End: 2025-04-04
Payer: COMMERCIAL

## 2025-04-04 DIAGNOSIS — J06.9 UPPER RESPIRATORY TRACT INFECTION, UNSPECIFIED TYPE: ICD-10-CM

## 2025-04-09 RX ORDER — ALBUTEROL SULFATE 90 UG/1
2 INHALANT RESPIRATORY (INHALATION) EVERY 6 HOURS PRN
Qty: 18 G | Refills: 0 | OUTPATIENT
Start: 2025-04-09

## 2025-04-09 NOTE — TELEPHONE ENCOUNTER
Nurse Triage SBAR    Is this a 2nd Level Triage? NO    Situation:  Request for refill on albuterol inhaler.     Background:  Prescribed for acute illness/wheezing.     Assessment:  Patient states she got better but is now sick again. Has some wheezing, cough and sore throat. Feels short of breath with exertion. Denies fevers. Denies chest pain.     Protocol Recommended Disposition:   Go To Office Now    Recommendation:  Patient has scheduled appointment today.     Does the patient meet one of the following criteria for ADS visit consideration? 16+ years old, with an [facility] PCP     TIP  Providers, please consider if this condition is appropriate for management at one of our Acute and Diagnostic Services sites.     If patient is a good candidate, please use dotphrase <dot>triageresponse and select Refer to ADS to document.    Reason for Disposition   MILD difficulty breathing (e.g., minimal/no SOB at rest, SOB with walking, pulse < 100) of new-onset or worse than normal    Additional Information   Negative: SEVERE difficulty breathing (e.g., struggling for each breath, speaks in single words, pulse > 120)   Negative: Breathing stopped and hasn't returned   Negative: Choking on something   Negative: Bluish (or gray) lips or face   Negative: Difficult to awaken or acting confused (e.g., disoriented, slurred speech)   Negative: Passed out (e.g., fainted, lost consciousness, blacked out and was not responding)   Negative: Wheezing started suddenly after medicine, an allergic food, or bee sting   Negative: Stridor (harsh sound while breathing in)   Negative: Slow, shallow and weak breathing   Negative: Sounds like a life-threatening emergency to the triager   Negative: Chest pain   Negative: Wheezing (high pitched whistling sound) and previous asthma attacks or use of asthma medicines   Negative: Breathing difficulty and within 14 days of COVID-19 EXPOSURE (close contact) with someone diagnosed with COVID-19 (e.g., COVID  "test positive)   Negative: Breathing difficulty and COVID-19 is widespread in the community   Negative: Breathing diffculty and only present when coughing   Negative: Breathing difficulty and only from stuffy nose   Negative: Breathing diffculty and only from stuffy nose or runny nose from common cold   Negative: MODERATE difficulty breathing (e.g., speaks in phrases, SOB even at rest, pulse 100-120) of new-onset or worse than normal   Negative: Oxygen level (e.g., pulse oximetry) 90% or lower   Negative: Wheezing can be heard across the room   Negative: Drooling or spitting out saliva (because can't swallow)   Negative: Any history of prior \"blood clot\" in leg or lungs   Negative: Illness requiring prolonged bedrest in past month (e.g., immobilization, long hospital stay)   Negative: Hip or leg fracture (broken bone) in past month (or had cast on leg or ankle in past month)   Negative: Major surgery in the past month   Negative: Long-distance travel in past month (e.g., car, bus, train, plane; with trip lasting 6 or more hours)   Negative: Cancer treatment in past six months (or has cancer now)   Negative: Extra heartbeats, irregular heart beating, or heart is beating very fast (i.e., 'palpitations')   Negative: Fever > 103 F (39.4 C)   Negative: Fever > 101 F (38.3 C) and over 60 years of age   Negative: Fever > 100 F (37.8 C) and bedridden (e.g., nursing home patient, stroke, chronic illness, recovering from surgery)   Negative: Fever > 100 F (37.8 C) and diabetes mellitus or weak immune system (e.g., HIV positive, cancer chemo, splenectomy, organ transplant, chronic steroids)   Negative: Periods where breathing stops and then resumes normally and bedridden (e.g., nursing home patient, CVA)   Negative: Pregnant or postpartum (from 0 to 6 weeks after delivery)   Negative: Patient sounds very sick or weak to the triager    Protocols used: Breathing Difficulty-A-OH    "

## 2025-04-27 ENCOUNTER — HEALTH MAINTENANCE LETTER (OUTPATIENT)
Age: 25
End: 2025-04-27

## 2025-06-05 ENCOUNTER — TELEPHONE (OUTPATIENT)
Dept: FAMILY MEDICINE | Facility: CLINIC | Age: 25
End: 2025-06-05

## 2025-06-05 NOTE — TELEPHONE ENCOUNTER
Provider unavailable for the remainder of the day.  Patient informed. Rescheduled patient with Dr. Arriola on 6/20/25 at 4:10 pm.

## 2025-07-22 ENCOUNTER — HOSPITAL ENCOUNTER (OUTPATIENT)
Dept: NUTRITION | Facility: CLINIC | Age: 25
Discharge: HOME | End: 2025-07-22
Payer: COMMERCIAL

## 2025-07-22 DIAGNOSIS — R11.0 NAUSEA: ICD-10-CM

## 2025-07-22 DIAGNOSIS — R63.0 POOR APPETITE: Primary | ICD-10-CM

## 2025-07-22 PROCEDURE — 97802 MEDICAL NUTRITION INDIV IN: CPT | Mod: GT,95 | Performed by: DIETITIAN, REGISTERED

## 2025-07-22 RX ORDER — MIRTAZAPINE 15 MG/1
15 TABLET, FILM COATED ORAL AT BEDTIME
Qty: 90 TABLET | Refills: 0 | Status: SHIPPED | OUTPATIENT
Start: 2025-07-22

## 2025-07-22 NOTE — PROGRESS NOTES
"[facility]  Medical Nutrition Therapy    Visit Type: Initial Assessment    Ofe Samuels, 25 year old referred by Carissa Hicks APRN CNP for MNT related to R11.0 (ICD-10-CM) - Nausea       Virtual Visit Details    Type of service:  Video Visit   Video Start Time: 10:07  Video End Time:10:50    Originating Location (pt. Location): Home    Distant Location (provider location):  On-site  Platform used for Video Visit: [employer]         Nutrition Assessment:  Anthropometrics  Height:   Ht Readings from Last 1 Encounters:   03/11/25 1.575 m (5' 2\")         BMI:  18.4  Weight Status:  Normal BMI   Weight:   Wt Readings from Last 1 Encounters:   03/14/25 45.7 kg (100 lb 11.2 oz)       UBW: 130 lb      IBW:  50 kg (110 lb) IBW %: 91%        Weight History:   Wt Readings from Last 20 Encounters:   03/14/25 45.7 kg (100 lb 11.2 oz)   03/11/25 45 kg (99 lb 3.2 oz)   03/09/25 44.2 kg (97 lb 6.4 oz)   12/16/24 46.5 kg (102 lb 9.6 oz)   11/02/24 49.4 kg (108 lb 14.4 oz)   03/22/24 50.1 kg (110 lb 6.4 oz)   01/24/24 51.3 kg (113 lb 0.3 oz)   01/18/24 55.2 kg (121 lb 12.8 oz)   01/12/24 53.9 kg (118 lb 14.4 oz)   01/04/24 54 kg (119 lb)   -Wt loss of 21 lb (17.4 %) over a 14 month period from 01/2024-03/2025. No recent wt over the past 4 months.   -Patient reports her current wt is at less than 90 lb but she isn't sure exactly, as she doesn't have a scale. Wt loss of 10 lb (10%) over the past 4 months.   -She gave birth on 01/22/2024 at a weight of 121 lb. She lost weight during her pregnancy and was lower than her UBW during pregnancy.     Goal Weight:   -130 lb is her reported UBW.     Nutrition History    PMH:   Patient Active Problem List   Diagnosis    Supervision of high risk pregnancy in third trimester    Iron deficiency anemia of mother during pregnancy    Pregnancy complicated by fetal cerebral ventriculomegaly    Pregnancy affected by fetal growth restriction    Pregnancy      -Patient feels nausea " sometimes. She felt like vomiting when sipping on coffee one time but not typically. Here appetite is very low and foods are unappealing. These symptoms have been going on for a long time. She is very concerned about the unintended weight loss and would like to gain weight but can't bring herself to take bites of food.       Labs: reviewed      Meds:   Current Outpatient Medications   Medication Instructions    acetaminophen (TYLENOL) 500-1,000 mg, Oral, EVERY 6 HOURS PRN    albuterol (PROAIR HFA/PROVENTIL HFA/VENTOLIN HFA) 108 (90 Base) MCG/ACT inhaler 2 puffs, Inhalation, EVERY 6 HOURS PRN    benzonatate (TESSALON) 200 mg, Oral, 3 TIMES DAILY PRN    gabapentin (NEURONTIN) 100 mg, Oral, 3 TIMES DAILY    ibuprofen (ADVIL/MOTRIN) 600 mg, Oral, EVERY 6 HOURS PRN, Start after delivery    ibuprofen (ADVIL/MOTRIN) 400 mg, Oral, EVERY 6 HOURS PRN    metoclopramide (REGLAN) 5 mg, Oral, 3 TIMES DAILY PRN, Take before meals    norgestimate-ethinyl estradiol (ORTHO-CYCLEN) 0.25-35 MG-MCG tablet 1 tablet, Oral, DAILY    Prenatal Vit-Fe Fumarate-FA (PRENATAL VITAMINS) 28-0.8 MG TABS 1 tablet, Oral, DAILY AT 2 PM        Supplements: reviewed      Social/Environmental:   -average sleep per night:   -level of stress: 4 out of 10. Feeling worried throughout the day.        Food Record  Breakfast: 4 am: Bread OR cheese Italian OR juice smoothie (avocado, banana, strawberries and sometimes adds Boost to it and whole milk)  Snack: n/a  Lunch: Bread   Snack: n/a  Dinner: n/a  Snack: n/a  Beverages: water  -Take-out none  -Drinking Boost High Calories (360 kcal) twice per day but they are too expensive.       Nutritional Details:   -Food allergies: none  -Food intolerances: none  -Food sensitivities: none  -GI concerns: Diarrhea every day   -Appetite: very low  -Pace of eating: slow   -Role of cooking: self   -Role of food shopping: self       Physical Activity:  none     ASSESSED MALNUTRITION STATUS  % Weight Loss:  > 7.5% in 3 months  (severe malnutrition)  % Intake:  <75% for >/= 3 months (moderate malnutrition)  Subcutaneous Fat Loss:  Unable to determine via video   Loss of Muscle Mass:  Unable to determine via video   Fluid Retention:  None noted    Malnutrition Diagnosis:  Moderate malnutrition  In the Context of:  Environmental or social circumstances      Nutrition Diagnosis:  Malnutrition related to poor appetite as evidenced by weight loss of 10 lb (10%) over the past 4 months, usual dietary intake meeting <50% calorie and protein needs, and report of not feeling hungry for >1 year.       Nutrition Intervention:  Nutrition Prescription Summary: MNT for Weight Gain      Nutrition Education (Content):  -Educated patient on nutrition therapy for weight gain.   -Recommended eating 6 small meals per day instead of three large ones, and always including a source of protein with each mini meal, plus added fats for additional calories.   -Discussed ways to add calories and protein to the diet. For more protein, consume meats, eggs, fish, full-fat dairy products, legumes such as beans, peas, and lentils, quinoa, couscous, soy including edamame and tofu, nuts and seeds, peanut butter or sunbutter, and dry milk powder or whey protein powder.   -For more calories, add butter, oils, mayonnaise, avocados, sour cream, any type of cheese, sweetened condensed milk, whole milk, half & half, ice cream, and gravy  -Recommended trying ONS such as Ensure Plus or Boost Plus.  -Explained ways to add these high protein and high calorie foods to meals. High Calorie/High Recipe book provided. Emphasized trying milk shakes or smoothies for a variety of flavors (add ONS to shakes as well).       Emailed/mailed information discussed including nutrition handouts to patient.       Nutrition Prescription: Macronutrient and Micronutrient details   Dosing weight: 46 kg (current wt)  Energy: 6438-7175 kcals/day (30-35 Kcal/Kg)    Justification:  (repletion)   Protein:  55-69 g Pro/day (1.2-1.5 g pro/Kg)    Justification:  (Repletion)   Fluid: 7435-2225 mL/day   (1 mL/Kcal)     Justification:  (repletion)   Fiber:     Women (19-50 years): 25 grams per day          Carbohydrate: 45-65% kcal   <25 g added sugar/day       Fat: 20-35% kcal  <7% kcal from saturated fat   Micronutrient: DRI  <2,300 mg sodium/day            Vitamin and Mineral Supplements: MVT+M       Patient Engagement:   Assessed learning needs and learning preference: Yes.  Teaching Method(s) used: Explanation  Video    Nutrition Education (Application):  a)  Discussed current eating plans / recommended alternative food choices    b)  Patient verbalizes understanding of diet by asking questions     Anticipate >50% compliance   Stage of Change:  preparation      Nutrition Goals:  1) Start an appetite stimulant such as Remeron (message will be sent to PCP for approval)  2) Continue drinking Boost Plus or Ensure Plus 8 oz twice per day   3) Eat 6 mini meals per day   4) Each mini meal should a have a protein source (meat, fish, eggs, black beans, chickpeas, lentils, garrett beans, quinoa, peas, edamame, tofu, whole milk, full-fat Greek yogurt, cottage cheese, sliced cheese, shredded cheese) plus 1-2 other food groups  5) Add additional fats to meals (olive oil, avocado oil, butter, peanut butter, avocado, nuts and seeds)  6) Take a multivitamin daily       Nutrition Follow Up / Monitoring:   Weight, PO intake, PA, appetite       Nutrition Recommendations:  Patient to follow-up with RD in 8 weeks.  Patient has RD contact information to call/email if needed.      Start time: 10:07  End time: 10:50    Total Time Duration: 43 min      Cleopatra Tate MS, RD, LD, [facility]  Clinical Dietitian  [phone]

## 2025-07-31 ENCOUNTER — VIRTUAL VISIT (OUTPATIENT)
Dept: OBGYN | Facility: CLINIC | Age: 25
End: 2025-07-31
Payer: COMMERCIAL

## 2025-07-31 ENCOUNTER — TELEPHONE (OUTPATIENT)
Dept: OBGYN | Facility: CLINIC | Age: 25
End: 2025-07-31

## 2025-07-31 VITALS — HEIGHT: 62 IN | BODY MASS INDEX: 18.42 KG/M2

## 2025-07-31 DIAGNOSIS — Z34.90 ENCOUNTER FOR SUPERVISION OF NORMAL PREGNANCY: Primary | ICD-10-CM

## 2025-07-31 DIAGNOSIS — O21.9 NAUSEA AND VOMITING DURING PREGNANCY PRIOR TO 22 WEEKS GESTATION: Primary | ICD-10-CM

## 2025-07-31 DIAGNOSIS — Z23 NEED FOR DIPHTHERIA-TETANUS-PERTUSSIS (TDAP) VACCINE: ICD-10-CM

## 2025-07-31 RX ORDER — PYRIDOXINE HCL (VITAMIN B6) 25 MG
25 TABLET ORAL 3 TIMES DAILY
Qty: 180 TABLET | Refills: 3 | Status: SHIPPED | OUTPATIENT
Start: 2025-07-31

## 2025-07-31 NOTE — TELEPHONE ENCOUNTER
Patient is requesting B6, Unisom to the updated pharmacy.     She was requesting a letter for work due to her nausea, she works at a coffee shop. Told her that she would have to be seen first but would send a message    She has her NOB with Camelia 8/22/2025.      Christin Green LPN
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
## 2025-08-05 DIAGNOSIS — R11.0 NAUSEA: ICD-10-CM

## 2025-08-05 DIAGNOSIS — R63.0 POOR APPETITE: Primary | ICD-10-CM

## 2025-08-05 RX ORDER — LACTOSE-REDUCED FOOD
8 LIQUID (ML) ORAL 2 TIMES DAILY
Qty: 3840 ML | Refills: 11 | Status: SHIPPED | OUTPATIENT
Start: 2025-08-05

## 2025-08-22 ENCOUNTER — ANCILLARY PROCEDURE (OUTPATIENT)
Dept: ULTRASOUND IMAGING | Facility: CLINIC | Age: 25
End: 2025-08-22
Payer: COMMERCIAL

## 2025-08-22 DIAGNOSIS — Z34.90 ENCOUNTER FOR SUPERVISION OF NORMAL PREGNANCY: ICD-10-CM

## 2025-08-22 PROCEDURE — 76801 OB US < 14 WKS SINGLE FETUS: CPT | Performed by: OBSTETRICS & GYNECOLOGY

## 2025-08-25 ENCOUNTER — TELEPHONE (OUTPATIENT)
Dept: ENDOCRINOLOGY | Facility: CLINIC | Age: 25
End: 2025-08-25
Payer: COMMERCIAL

## 2025-08-27 ENCOUNTER — PRE VISIT (OUTPATIENT)
Dept: ENDOCRINOLOGY | Facility: CLINIC | Age: 25
End: 2025-08-27

## 2025-08-27 ENCOUNTER — VIRTUAL VISIT (OUTPATIENT)
Dept: ENDOCRINOLOGY | Facility: CLINIC | Age: 25
End: 2025-08-27
Payer: COMMERCIAL

## 2025-08-27 ASSESSMENT — PAIN SCALES - GENERAL: PAINLEVEL_OUTOF10: SEVERE PAIN (7)

## (undated) RX ORDER — FENTANYL CITRATE 50 UG/ML
INJECTION, SOLUTION INTRAMUSCULAR; INTRAVENOUS
Status: DISPENSED
Start: 2024-01-22